# Patient Record
Sex: MALE | Race: OTHER | URBAN - METROPOLITAN AREA
[De-identification: names, ages, dates, MRNs, and addresses within clinical notes are randomized per-mention and may not be internally consistent; named-entity substitution may affect disease eponyms.]

---

## 2018-02-17 ENCOUNTER — INPATIENT (INPATIENT)
Facility: HOSPITAL | Age: 78
LOS: 4 days | Discharge: HOME HEALTH | End: 2018-02-22
Attending: INTERNAL MEDICINE

## 2018-02-17 VITALS
HEART RATE: 73 BPM | SYSTOLIC BLOOD PRESSURE: 160 MMHG | DIASTOLIC BLOOD PRESSURE: 87 MMHG | RESPIRATION RATE: 18 BRPM | TEMPERATURE: 98 F

## 2018-02-17 DIAGNOSIS — Z96.652 PRESENCE OF LEFT ARTIFICIAL KNEE JOINT: Chronic | ICD-10-CM

## 2018-02-17 LAB
ALBUMIN SERPL ELPH-MCNC: 3.8 G/DL — SIGNIFICANT CHANGE UP (ref 3–5.5)
ALP SERPL-CCNC: 54 U/L — SIGNIFICANT CHANGE UP (ref 30–115)
ALT FLD-CCNC: 7 U/L — SIGNIFICANT CHANGE UP (ref 0–41)
ANION GAP SERPL CALC-SCNC: 3 MMOL/L — LOW (ref 7–14)
ANION GAP SERPL CALC-SCNC: 5 MMOL/L — LOW (ref 7–14)
APTT BLD: 21.1 SEC — CRITICAL LOW (ref 27–39.2)
AST SERPL-CCNC: 33 U/L — SIGNIFICANT CHANGE UP (ref 0–41)
BASE EXCESS BLDV CALC-SCNC: 1.4 MMOL/L — SIGNIFICANT CHANGE UP (ref -2–2)
BASOPHILS # BLD AUTO: 0.07 K/UL — SIGNIFICANT CHANGE UP (ref 0–0.2)
BASOPHILS NFR BLD AUTO: 0.8 % — SIGNIFICANT CHANGE UP (ref 0–1)
BILIRUB SERPL-MCNC: 1.4 MG/DL — HIGH (ref 0.2–1.2)
BUN SERPL-MCNC: 13 MG/DL — SIGNIFICANT CHANGE UP (ref 10–20)
BUN SERPL-MCNC: 17 MG/DL — SIGNIFICANT CHANGE UP (ref 10–20)
CA-I SERPL-SCNC: 1.3 MMOL/L — SIGNIFICANT CHANGE UP (ref 1.12–1.3)
CALCIUM SERPL-MCNC: 9.5 MG/DL — SIGNIFICANT CHANGE UP (ref 8.5–10.1)
CALCIUM SERPL-MCNC: 9.6 MG/DL — SIGNIFICANT CHANGE UP (ref 8.5–10.1)
CHLORIDE SERPL-SCNC: 104 MMOL/L — SIGNIFICANT CHANGE UP (ref 98–110)
CHLORIDE SERPL-SCNC: 106 MMOL/L — SIGNIFICANT CHANGE UP (ref 98–110)
CK MB CFR SERPL CALC: 2.3 NG/ML — SIGNIFICANT CHANGE UP (ref 0.6–6.3)
CO2 SERPL-SCNC: 27 MMOL/L — SIGNIFICANT CHANGE UP (ref 17–32)
CO2 SERPL-SCNC: 27 MMOL/L — SIGNIFICANT CHANGE UP (ref 17–32)
CREAT SERPL-MCNC: 1.1 MG/DL — SIGNIFICANT CHANGE UP (ref 0.7–1.5)
CREAT SERPL-MCNC: 1.1 MG/DL — SIGNIFICANT CHANGE UP (ref 0.7–1.5)
EOSINOPHIL # BLD AUTO: 0.12 K/UL — SIGNIFICANT CHANGE UP (ref 0–0.7)
EOSINOPHIL NFR BLD AUTO: 1.3 % — SIGNIFICANT CHANGE UP (ref 0–8)
GAS PNL BLDV: 139 MMOL/L — SIGNIFICANT CHANGE UP (ref 136–145)
GAS PNL BLDV: SIGNIFICANT CHANGE UP
GLUCOSE SERPL-MCNC: 94 MG/DL — SIGNIFICANT CHANGE UP (ref 70–110)
GLUCOSE SERPL-MCNC: 94 MG/DL — SIGNIFICANT CHANGE UP (ref 70–110)
HCO3 BLDV-SCNC: 28 MMOL/L — SIGNIFICANT CHANGE UP (ref 22–29)
HCT VFR BLD CALC: 40.4 % — LOW (ref 42–52)
HGB BLD CALC-MCNC: 14.9 G/DL — SIGNIFICANT CHANGE UP (ref 14–18)
HGB BLD-MCNC: 13 G/DL — LOW (ref 14–18)
IMM GRANULOCYTES NFR BLD AUTO: 0.5 % — HIGH (ref 0.1–0.3)
INR BLD: 1 RATIO — SIGNIFICANT CHANGE UP (ref 0.65–1.3)
LACTATE BLDV-MCNC: 1.5 MMOL/L — SIGNIFICANT CHANGE UP (ref 0.5–1.6)
LYMPHOCYTES # BLD AUTO: 1.06 K/UL — LOW (ref 1.2–3.4)
LYMPHOCYTES # BLD AUTO: 11.6 % — LOW (ref 20.5–51.1)
MCHC RBC-ENTMCNC: 25.1 PG — LOW (ref 27–31)
MCHC RBC-ENTMCNC: 32.2 G/DL — SIGNIFICANT CHANGE UP (ref 32–37)
MCV RBC AUTO: 78.1 FL — LOW (ref 80–94)
MONOCYTES # BLD AUTO: 0.46 K/UL — SIGNIFICANT CHANGE UP (ref 0.1–0.6)
MONOCYTES NFR BLD AUTO: 5 % — SIGNIFICANT CHANGE UP (ref 1.7–9.3)
NEUTROPHILS # BLD AUTO: 7.41 K/UL — HIGH (ref 1.4–6.5)
NEUTROPHILS NFR BLD AUTO: 80.8 % — HIGH (ref 42.2–75.2)
NRBC # BLD: 0 /100 WBCS — SIGNIFICANT CHANGE UP (ref 0–0)
PCO2 BLDV: 52 MMHG — HIGH (ref 41–51)
PH BLDV: 7.34 — SIGNIFICANT CHANGE UP (ref 7.26–7.43)
PLATELET # BLD AUTO: 236 K/UL — SIGNIFICANT CHANGE UP (ref 130–400)
PO2 BLDV: 34 MMHG — SIGNIFICANT CHANGE UP (ref 20–40)
POTASSIUM BLDV-SCNC: 4.6 MMOL/L — SIGNIFICANT CHANGE UP (ref 3.3–5.6)
POTASSIUM SERPL-MCNC: 4.2 MMOL/L — SIGNIFICANT CHANGE UP (ref 3.5–5)
POTASSIUM SERPL-MCNC: SIGNIFICANT CHANGE UP MMOL/L (ref 3.5–5)
POTASSIUM SERPL-SCNC: 4.2 MMOL/L — SIGNIFICANT CHANGE UP (ref 3.5–5)
POTASSIUM SERPL-SCNC: SIGNIFICANT CHANGE UP MMOL/L (ref 3.5–5)
PROT SERPL-MCNC: 6 G/DL — SIGNIFICANT CHANGE UP (ref 6–8)
PROTHROM AB SERPL-ACNC: 10.8 SEC — SIGNIFICANT CHANGE UP (ref 9.95–12.87)
RBC # BLD: 5.17 M/UL — SIGNIFICANT CHANGE UP (ref 4.7–6.1)
RBC # FLD: 14.7 % — HIGH (ref 11.5–14.5)
SAO2 % BLDV: 63 % — SIGNIFICANT CHANGE UP
SODIUM SERPL-SCNC: 134 MMOL/L — LOW (ref 135–146)
SODIUM SERPL-SCNC: 138 MMOL/L — SIGNIFICANT CHANGE UP (ref 135–146)
TROPONIN I SERPL-MCNC: 0.02 NG/ML — SIGNIFICANT CHANGE UP (ref 0–0.05)
WBC # BLD: 9.17 K/UL — SIGNIFICANT CHANGE UP (ref 4.8–10.8)
WBC # FLD AUTO: 9.17 K/UL — SIGNIFICANT CHANGE UP (ref 4.8–10.8)

## 2018-02-17 RX ORDER — HEPARIN SODIUM 5000 [USP'U]/ML
5000 INJECTION INTRAVENOUS; SUBCUTANEOUS EVERY 8 HOURS
Qty: 0 | Refills: 0 | Status: DISCONTINUED | OUTPATIENT
Start: 2018-02-17 | End: 2018-02-22

## 2018-02-17 RX ORDER — TRAMADOL HYDROCHLORIDE 50 MG/1
50 TABLET ORAL EVERY 12 HOURS
Qty: 0 | Refills: 0 | Status: DISCONTINUED | OUTPATIENT
Start: 2018-02-17 | End: 2018-02-20

## 2018-02-17 RX ORDER — AMLODIPINE BESYLATE 2.5 MG/1
5 TABLET ORAL DAILY
Qty: 0 | Refills: 0 | Status: DISCONTINUED | OUTPATIENT
Start: 2018-02-17 | End: 2018-02-17

## 2018-02-17 RX ORDER — ATORVASTATIN CALCIUM 80 MG/1
0 TABLET, FILM COATED ORAL
Qty: 0 | Refills: 0 | COMMUNITY

## 2018-02-17 RX ORDER — TETANUS TOXOID, REDUCED DIPHTHERIA TOXOID AND ACELLULAR PERTUSSIS VACCINE, ADSORBED 5; 2.5; 8; 8; 2.5 [IU]/.5ML; [IU]/.5ML; UG/.5ML; UG/.5ML; UG/.5ML
0.5 SUSPENSION INTRAMUSCULAR ONCE
Qty: 0 | Refills: 0 | Status: COMPLETED | OUTPATIENT
Start: 2018-02-17 | End: 2018-02-17

## 2018-02-17 RX ORDER — ATORVASTATIN CALCIUM 80 MG/1
10 TABLET, FILM COATED ORAL AT BEDTIME
Qty: 0 | Refills: 0 | Status: DISCONTINUED | OUTPATIENT
Start: 2018-02-17 | End: 2018-02-22

## 2018-02-17 RX ORDER — METOPROLOL TARTRATE 50 MG
0 TABLET ORAL
Qty: 0 | Refills: 0 | COMMUNITY

## 2018-02-17 RX ORDER — PANTOPRAZOLE SODIUM 20 MG/1
40 TABLET, DELAYED RELEASE ORAL
Qty: 0 | Refills: 0 | Status: DISCONTINUED | OUTPATIENT
Start: 2018-02-17 | End: 2018-02-22

## 2018-02-17 RX ORDER — AMLODIPINE BESYLATE 2.5 MG/1
1 TABLET ORAL
Qty: 0 | Refills: 0 | COMMUNITY

## 2018-02-17 RX ORDER — HYDROCHLOROTHIAZIDE 25 MG
12.5 TABLET ORAL DAILY
Qty: 0 | Refills: 0 | Status: DISCONTINUED | OUTPATIENT
Start: 2018-02-17 | End: 2018-02-18

## 2018-02-17 RX ORDER — LOSARTAN POTASSIUM 100 MG/1
50 TABLET, FILM COATED ORAL DAILY
Qty: 0 | Refills: 0 | Status: DISCONTINUED | OUTPATIENT
Start: 2018-02-17 | End: 2018-02-21

## 2018-02-17 RX ORDER — SODIUM CHLORIDE 9 MG/ML
1000 INJECTION INTRAMUSCULAR; INTRAVENOUS; SUBCUTANEOUS
Qty: 0 | Refills: 0 | Status: DISCONTINUED | OUTPATIENT
Start: 2018-02-17 | End: 2018-02-20

## 2018-02-17 RX ADMIN — ATORVASTATIN CALCIUM 10 MILLIGRAM(S): 80 TABLET, FILM COATED ORAL at 22:01

## 2018-02-17 RX ADMIN — HEPARIN SODIUM 5000 UNIT(S): 5000 INJECTION INTRAVENOUS; SUBCUTANEOUS at 22:01

## 2018-02-17 RX ADMIN — TETANUS TOXOID, REDUCED DIPHTHERIA TOXOID AND ACELLULAR PERTUSSIS VACCINE, ADSORBED 0.5 MILLILITER(S): 5; 2.5; 8; 8; 2.5 SUSPENSION INTRAMUSCULAR at 15:49

## 2018-02-17 NOTE — H&P ADULT - ASSESSMENT
78 year old male w/ hx of htn / dementia presents s/p fall w/ syncope    # Fall  - 2/2 syncopal episode, unclear etiology  - ddx includes cardiac / neurological / vascular etiologies  - check orthostatics,   -     # Dementia   - mild to moderate, pt alert and oriented to person and place currently  - not currently on any medications    # Hypertension  - c/w home meds    # DVT ppx    # From elderly home / ambulates without assistance 78 year old male w/ hx of htn / dementia presents s/p fall w/ syncope    # Fall  - 2/2 syncopal episode, unclear etiology  - ddx includes cardiac / neurological / vascular etiologies  - trauma w/u negatie / CT head negative / electrolytes wnl / cbc wnl  - check orthostatics / 2d echo / carotid duplex /   - monitor on telemetry    # Dementia   - mild to moderate, pt alert and oriented to person and place currently  - not currently on any medications    # Hypertension  - /87, c/w home meds    # DVT ppx    # From elderly home / ambulates without assistance 78 year old male w/ hx of htn / dementia presents s/p fall w/ syncope    # Fall  - 2/2 syncopal episode, unclear etiology  - ddx includes cardiac / neurological / vascular etiologies  - trauma w/u negatie / CT head negative / electrolytes wnl / cbc wnl  - check orthostatics / 2d echo / carotid duplex /   - monitor on telemetry    # Dementia   - mild to moderate, pt alert and oriented to person and place currently  - not currently on any medications, consider starting donepezil  - check b12, folate, tsh    # Hypertension  - /87, c/w home meds    # DVT ppx    # From elderly home / ambulates without assistance 78 year old male w/ hx of htn / dementia presents s/p fall w/ syncope    # Fall  - 2/2 syncopal episode, unclear etiology  - ddx includes cardiac / neurological / vascular etiologies  - trauma w/u negatie / CT head negative / electrolytes wnl k+ hemolyzed will recheck/ cbc wnl  - check orthostatics / 2d echo / carotid duplex /   - monitor on telemetry    # Dementia   - mild to moderate, pt alert and oriented to person and place currently  - not currently on any medications, consider starting donepezil  - check b12, folate, tsh    # Hypertension  - /87, c/w home meds    # DVT ppx    # From elderly home / ambulates without assistance

## 2018-02-17 NOTE — H&P ADULT - NSHPREVIEWOFSYSTEMS_GEN_ALL_CORE
Review of Systems: Unable to obtain secondary to confusion REVIEW OF SYSTEMS:    CONSTITUTIONAL: No weakness, fevers or chills  EYES/ENT: No visual changes;  No vertigo or throat pain   NECK: No pain or stiffness  RESPIRATORY: No cough, wheezing, hemoptysis; No shortness of breath  CARDIOVASCULAR: No chest pain or palpitations  GASTROINTESTINAL: No abdominal or epigastric pain. No nausea, vomiting, or hematemesis; No diarrhea or constipation. No melena or hematochezia.  GENITOURINARY: No dysuria, frequency or hematuria  MUSCULOSKELETAL: Left hand pain / left eye pain  NEUROLOGICAL: No numbness or weakness  SKIN: No itching, rashes

## 2018-02-17 NOTE — H&P ADULT - HISTORY OF PRESENT ILLNESS
78 year old male w/ pmhx of htn, dementia lives at home with his wife.     79 yo M with unknown medical hx secondary to confusion, BIBEMS for fall, onset today, associated with abrasion to forehead, laceration to lip and left cheek, and left hand pain. Using a , spoke to patient regarding the fall, he states that he cannot remember the fall and came back to his "conciousness" when he was in the ambulance. Patient cannot recall his medications. Aside from the hand pain and the lip pain, patient is not complaining of any other pain 78 year old male w/ pmhx of htn, dementia lives in an elderly home with his wife - however at baseline functions mostly independently. Today was walking to a [facility] - at some point he was found on the floor by a passerby who called ems. The patient himself does not recall any of this and seems to begin remembering things from when he was in the ambulance. Does not remembering falling or the events that led up to this. In the ER pt found to have abrasion of the forehead, laceration to the lip and left cheek / as well as let hand pain. He does not have any other complaints.

## 2018-02-17 NOTE — ED PROVIDER NOTE - EYES, MLM
Clear bilaterally, right pupil reactive light asymmetry, left pupil round and reactive to light, EOMI

## 2018-02-17 NOTE — H&P ADULT - NSHPLABSRESULTS_GEN_ALL_CORE
13.0   9.17  )-----------( 236      ( 17 Feb 2018 10:57 )             40.4       02-17    138  |  106  |  17  ----------------------------<  94  tnp   |  27  |  1.1    Ca    9.6      17 Feb 2018 10:57    TPro  6.0  /  Alb  3.8  /  TBili  1.4<H>  /  DBili  x   /  AST  33  /  ALT  7   /  AlkPhos  54  02-17      PT/INR - ( 17 Feb 2018 10:57 )   PT: 10.80 sec;   INR: 1.00 ratio      PTT - ( 17 Feb 2018 10:57 )  PTT:21.1 sec    CARDIAC MARKERS ( 17 Feb 2018 10:57 )  0.02 ng/mL / x     / x     / x     / 2.3 ng/mL

## 2018-02-17 NOTE — ED PROVIDER NOTE - PROGRESS NOTE DETAILS
Spoke to Nasreen, daughter, on the phone [phone], aware patient is in the hospital and is on her way here. Nasreen, daughter, at bedside, states patient has dementia and HTN. Does not know his medications, states that his heart is "good"

## 2018-02-17 NOTE — ED PROVIDER NOTE - MUSCULOSKELETAL, MLM
full ROM, left hand there is an abrasion to the dorsal aspect with tenderness, no muscle or joint tenderness

## 2018-02-17 NOTE — ED PROCEDURE NOTE - ATTENDING CONTRIBUTION TO CARE
I supervised resident or ACP on key aspects of procedure and was available at any time during procedure.

## 2018-02-17 NOTE — H&P ADULT - NSHPPHYSICALEXAM_GEN_ALL_CORE
PHYSICAL EXAM:   CONSTITUTIONAL: Well appearing, well nourished, awake, alert, oriented to person  ENMT: Airway patent, Nasal mucosa clear. Mouth with normal mucosa. there is a laceration to the upper lip that does involve the vermillion border, no tooth injury  EYES: Clear bilaterally, right pupil reactive light asymmetry, left pupil round and reactive to light, EOMI  CARDIAC: Normal rate, regular rhythm.  Heart sounds S1, S2.  RESPIRATORY: Breath sounds clear and equal bilaterally.  GASTROINTESTINAL: Abdomen soft, non-tender, no guarding.  MUSCULOSKELETAL: full ROM, left hand there is an abrasion to the dorsal aspect with tenderness, no muscle or joint tenderness  NEUROLOGICAL: Alert and oriented to person, CN II-XII intact, follows commands  SKIN: Skin normal color for race, warm, dry and intact. No evidence of rash. abrasions to the forehead and to the left hand, laceration to the left cheek and to the upper lip

## 2018-02-17 NOTE — ED PROVIDER NOTE - OBJECTIVE STATEMENT
[id number] 79 yo M with unknown medical hx secondary to confusion, BIBEMS for fall, onset today, associated with abrasion to forehead, laceration to lip and left cheek, and left hand pain. Using a , spoke to patient regarding the fall, he states that he cannot remember the fall and came back to his "conciousness" when he was in the ambulance. Patient cannot recall his medications. Aside from the hand pain and the lip pain, patient is not complaining of any other pain       [id number] 79 yo M with unknown medical hx secondary to confusion, BIBEMS for fall, onset today, associated with abrasion to forehead, laceration to lip and left cheek, and left hand pain. Using a , spoke to patient regarding the fall, he states that he cannot remember the fall and came back to his "conciousness" when he was in the ambulance. Patient cannot recall his medications. Aside from the hand pain and the lip pain, patient is not complaining of any other pain     [id number]    PMD: JOVON

## 2018-02-17 NOTE — ED PROVIDER NOTE - ATTENDING CONTRIBUTION TO CARE
77 y/o M, unclear med hx, here for eval s/p unwitnessed fall.  py is poor historian.  Went out for walk, then awoke in ambulance.  + pain to face and L hand and wrist.  no HA, cp, sob,.  No recent illness.  PE: + abrasion to L forehead and L cheek, + lac to upper lip, crossing vermilion border, + small chip to upper L central incisor; + dried blood to R nare w/o septal hematoma; no other dental or ent injury; neck supple; CTAB ;RRR; abd s/nt; pelvis stable; + abrasions and skin avulsions to L dorsal hand and wrist; + mild swelling at 3rd MCP; nl pulses and cap refill; extem o/w non tnder/ atraumatic; spine non ttp; neuro grossly non focal; ambulating.  IMP: fall, consider syncope; abrasions.  P: cth ct cspine, xray imaging, labs, ua, tdap, ivf, analgesia, reassess.

## 2018-02-17 NOTE — ED PROVIDER NOTE - SKIN, MLM
Skin normal color for race, warm, dry and intact. No evidence of rash. abrasions to the forehead and to the left hand, laceration to the left cheek and to the upper lip

## 2018-02-17 NOTE — ED PROVIDER NOTE - ENMT, MLM
Airway patent, Nasal mucosa clear. Mouth with normal mucosa. there is a laceration to the upper lip that does not involve the vermillion border, no tooth injury Airway patent, Nasal mucosa clear. Mouth with normal mucosa. there is a laceration to the upper lip that does involve the vermillion border, no tooth injury

## 2018-02-18 LAB
ANION GAP SERPL CALC-SCNC: 8 MMOL/L — SIGNIFICANT CHANGE UP (ref 7–14)
BUN SERPL-MCNC: 13 MG/DL — SIGNIFICANT CHANGE UP (ref 10–20)
CALCIUM SERPL-MCNC: 9.5 MG/DL — SIGNIFICANT CHANGE UP (ref 8.5–10.1)
CHLORIDE SERPL-SCNC: 107 MMOL/L — SIGNIFICANT CHANGE UP (ref 98–110)
CK MB CFR SERPL CALC: 2.4 NG/ML — SIGNIFICANT CHANGE UP (ref 0.6–6.3)
CK SERPL-CCNC: 162 U/L — SIGNIFICANT CHANGE UP (ref 0–225)
CO2 SERPL-SCNC: 23 MMOL/L — SIGNIFICANT CHANGE UP (ref 17–32)
CREAT SERPL-MCNC: 1 MG/DL — SIGNIFICANT CHANGE UP (ref 0.7–1.5)
GLUCOSE SERPL-MCNC: 78 MG/DL — SIGNIFICANT CHANGE UP (ref 70–110)
HCT VFR BLD CALC: 38.7 % — LOW (ref 42–52)
HGB BLD-MCNC: 12.3 G/DL — LOW (ref 14–18)
MAGNESIUM SERPL-MCNC: 2.1 MG/DL — SIGNIFICANT CHANGE UP (ref 1.8–2.4)
MCHC RBC-ENTMCNC: 24.7 PG — LOW (ref 27–31)
MCHC RBC-ENTMCNC: 31.8 G/DL — LOW (ref 32–37)
MCV RBC AUTO: 77.9 FL — LOW (ref 80–94)
NRBC # BLD: 0 /100 WBCS — SIGNIFICANT CHANGE UP (ref 0–0)
PLATELET # BLD AUTO: 218 K/UL — SIGNIFICANT CHANGE UP (ref 130–400)
POTASSIUM SERPL-MCNC: 4.2 MMOL/L — SIGNIFICANT CHANGE UP (ref 3.5–5)
POTASSIUM SERPL-SCNC: 4.2 MMOL/L — SIGNIFICANT CHANGE UP (ref 3.5–5)
RBC # BLD: 4.97 M/UL — SIGNIFICANT CHANGE UP (ref 4.7–6.1)
RBC # FLD: 14.8 % — HIGH (ref 11.5–14.5)
SODIUM SERPL-SCNC: 138 MMOL/L — SIGNIFICANT CHANGE UP (ref 135–146)
TROPONIN I SERPL-MCNC: 0.03 NG/ML — SIGNIFICANT CHANGE UP (ref 0–0.05)
WBC # BLD: 6.46 K/UL — SIGNIFICANT CHANGE UP (ref 4.8–10.8)
WBC # FLD AUTO: 6.46 K/UL — SIGNIFICANT CHANGE UP (ref 4.8–10.8)

## 2018-02-18 RX ADMIN — Medication 12.5 MILLIGRAM(S): at 05:43

## 2018-02-18 RX ADMIN — PANTOPRAZOLE SODIUM 40 MILLIGRAM(S): 20 TABLET, DELAYED RELEASE ORAL at 06:08

## 2018-02-18 RX ADMIN — TRAMADOL HYDROCHLORIDE 50 MILLIGRAM(S): 50 TABLET ORAL at 19:19

## 2018-02-18 RX ADMIN — ATORVASTATIN CALCIUM 10 MILLIGRAM(S): 80 TABLET, FILM COATED ORAL at 21:25

## 2018-02-18 RX ADMIN — LOSARTAN POTASSIUM 50 MILLIGRAM(S): 100 TABLET, FILM COATED ORAL at 05:43

## 2018-02-18 RX ADMIN — TRAMADOL HYDROCHLORIDE 50 MILLIGRAM(S): 50 TABLET ORAL at 05:43

## 2018-02-18 RX ADMIN — HEPARIN SODIUM 5000 UNIT(S): 5000 INJECTION INTRAVENOUS; SUBCUTANEOUS at 14:33

## 2018-02-18 RX ADMIN — HEPARIN SODIUM 5000 UNIT(S): 5000 INJECTION INTRAVENOUS; SUBCUTANEOUS at 21:25

## 2018-02-18 RX ADMIN — TRAMADOL HYDROCHLORIDE 50 MILLIGRAM(S): 50 TABLET ORAL at 18:19

## 2018-02-18 RX ADMIN — HEPARIN SODIUM 5000 UNIT(S): 5000 INJECTION INTRAVENOUS; SUBCUTANEOUS at 05:43

## 2018-02-18 RX ADMIN — TRAMADOL HYDROCHLORIDE 50 MILLIGRAM(S): 50 TABLET ORAL at 06:32

## 2018-02-19 LAB
FOLATE SERPL-MCNC: 9.6 NG/ML — SIGNIFICANT CHANGE UP (ref 4.8–24.2)
TSH SERPL-MCNC: 2.25 UIU/ML — SIGNIFICANT CHANGE UP (ref 0.27–4.2)
VIT B12 SERPL-MCNC: 163 PG/ML — LOW (ref 232–1245)

## 2018-02-19 RX ORDER — AMLODIPINE BESYLATE 2.5 MG/1
2.5 TABLET ORAL DAILY
Qty: 0 | Refills: 0 | Status: DISCONTINUED | OUTPATIENT
Start: 2018-02-19 | End: 2018-02-19

## 2018-02-19 RX ORDER — ASPIRIN/CALCIUM CARB/MAGNESIUM 324 MG
81 TABLET ORAL DAILY
Qty: 0 | Refills: 0 | Status: DISCONTINUED | OUTPATIENT
Start: 2018-02-19 | End: 2018-02-22

## 2018-02-19 RX ORDER — ONDANSETRON 8 MG/1
4 TABLET, FILM COATED ORAL EVERY 6 HOURS
Qty: 0 | Refills: 0 | Status: DISCONTINUED | OUTPATIENT
Start: 2018-02-19 | End: 2018-02-22

## 2018-02-19 RX ADMIN — TRAMADOL HYDROCHLORIDE 50 MILLIGRAM(S): 50 TABLET ORAL at 18:35

## 2018-02-19 RX ADMIN — PANTOPRAZOLE SODIUM 40 MILLIGRAM(S): 20 TABLET, DELAYED RELEASE ORAL at 06:07

## 2018-02-19 RX ADMIN — LOSARTAN POTASSIUM 50 MILLIGRAM(S): 100 TABLET, FILM COATED ORAL at 05:27

## 2018-02-19 RX ADMIN — HEPARIN SODIUM 5000 UNIT(S): 5000 INJECTION INTRAVENOUS; SUBCUTANEOUS at 05:27

## 2018-02-19 RX ADMIN — TRAMADOL HYDROCHLORIDE 50 MILLIGRAM(S): 50 TABLET ORAL at 05:27

## 2018-02-19 RX ADMIN — Medication 81 MILLIGRAM(S): at 18:35

## 2018-02-19 RX ADMIN — ATORVASTATIN CALCIUM 10 MILLIGRAM(S): 80 TABLET, FILM COATED ORAL at 21:11

## 2018-02-19 RX ADMIN — TRAMADOL HYDROCHLORIDE 50 MILLIGRAM(S): 50 TABLET ORAL at 06:07

## 2018-02-19 RX ADMIN — HEPARIN SODIUM 5000 UNIT(S): 5000 INJECTION INTRAVENOUS; SUBCUTANEOUS at 21:12

## 2018-02-19 RX ADMIN — HEPARIN SODIUM 5000 UNIT(S): 5000 INJECTION INTRAVENOUS; SUBCUTANEOUS at 15:07

## 2018-02-19 RX ADMIN — TRAMADOL HYDROCHLORIDE 50 MILLIGRAM(S): 50 TABLET ORAL at 21:10

## 2018-02-19 NOTE — PROGRESS NOTE ADULT - ASSESSMENT
78 year old male w/ hx of htn / dementia presents s/p fall w/ syncope    # Fall  - 2/2 syncopal episode, unclear etiology  - ddx includes cardiac / neurological / vascular etiologies  - trauma w/u negative / CT head negative / cbc wnl  - check orthostatics - / 2d echo results back/ carotid duplex results back /   - monitor on telemetry    # Dementia   - mild to moderate, pt alert and oriented to person and place  - not currently on any medications  - check b12, folate, tsh    # Hypertension  - elevated BP, c/w home meds, added amlodipine    # DVT ppx    # From elderly home / ambulates without assistance

## 2018-02-19 NOTE — PROGRESS NOTE ADULT - ATTENDING COMMENTS
Agree with resident note with exceptions.    1.Syncope  - r/o vertibrobasilar insufficiensy  - Neurology eval  - orthostatics+, IV fluids.  - HCTZ discontinued.  - F/u  UA.  - PT eval.  2. Gastritis  - protonix, zofran prn  3. HTN   ct losartan  4. Dyslipidemia  - ct statin .

## 2018-02-20 RX ORDER — SENNA PLUS 8.6 MG/1
1 TABLET ORAL THREE TIMES A DAY
Qty: 0 | Refills: 0 | Status: DISCONTINUED | OUTPATIENT
Start: 2018-02-20 | End: 2018-02-22

## 2018-02-20 RX ORDER — DOCUSATE SODIUM 100 MG
100 CAPSULE ORAL DAILY
Qty: 0 | Refills: 0 | Status: DISCONTINUED | OUTPATIENT
Start: 2018-02-20 | End: 2018-02-22

## 2018-02-20 RX ADMIN — PANTOPRAZOLE SODIUM 40 MILLIGRAM(S): 20 TABLET, DELAYED RELEASE ORAL at 06:25

## 2018-02-20 RX ADMIN — LOSARTAN POTASSIUM 50 MILLIGRAM(S): 100 TABLET, FILM COATED ORAL at 05:16

## 2018-02-20 RX ADMIN — SENNA PLUS 1 TABLET(S): 8.6 TABLET ORAL at 15:17

## 2018-02-20 RX ADMIN — HEPARIN SODIUM 5000 UNIT(S): 5000 INJECTION INTRAVENOUS; SUBCUTANEOUS at 05:16

## 2018-02-20 RX ADMIN — Medication 81 MILLIGRAM(S): at 12:03

## 2018-02-20 RX ADMIN — ATORVASTATIN CALCIUM 10 MILLIGRAM(S): 80 TABLET, FILM COATED ORAL at 21:21

## 2018-02-20 RX ADMIN — SENNA PLUS 1 TABLET(S): 8.6 TABLET ORAL at 21:21

## 2018-02-20 RX ADMIN — TRAMADOL HYDROCHLORIDE 50 MILLIGRAM(S): 50 TABLET ORAL at 05:16

## 2018-02-20 RX ADMIN — HEPARIN SODIUM 5000 UNIT(S): 5000 INJECTION INTRAVENOUS; SUBCUTANEOUS at 15:17

## 2018-02-20 RX ADMIN — TRAMADOL HYDROCHLORIDE 50 MILLIGRAM(S): 50 TABLET ORAL at 07:25

## 2018-02-20 RX ADMIN — HEPARIN SODIUM 5000 UNIT(S): 5000 INJECTION INTRAVENOUS; SUBCUTANEOUS at 21:22

## 2018-02-20 RX ADMIN — Medication 100 MILLIGRAM(S): at 15:16

## 2018-02-20 NOTE — CONSULT NOTE ADULT - SUBJECTIVE AND OBJECTIVE BOX
HPI:  78 year old male w/ pmhx of htn, dementia lives in an elderly home with his wife - however at baseline functions mostly independently. Today was walking to a [facility] - at some point he was found on the floor by a passerby who called ems. The patient himself does not recall any of this and seems to begin remembering things from when he was in the ambulance. Does not remembering falling or the events that led up to this. In the ER pt found to have abrasion of the forehead, laceration to the lip and left cheek / as well as let hand pain. He does not have any other complaints. (17 Feb 2018 16:51)      PAST MEDICAL & SURGICAL HISTORY:  Dementia  HTN (hypertension)  S/P TKR (total knee replacement), left      Hospital Course:        MEDICATIONS  (STANDING):  aspirin enteric coated 81 milliGRAM(s) Oral daily  atorvastatin 10 milliGRAM(s) Oral at bedtime  docusate sodium 100 milliGRAM(s) Oral daily  heparin  Injectable 5000 Unit(s) SubCutaneous every 8 hours  losartan 50 milliGRAM(s) Oral daily  pantoprazole    Tablet 40 milliGRAM(s) Oral before breakfast  senna 1 Tablet(s) Oral three times a day    MEDICATIONS  (PRN):  ondansetron Injectable 4 milliGRAM(s) IV Push every 6 hours PRN Nausea and/or Vomiting      FAMILY HISTORY:  No pertinent family history in first degree relatives      Allergies    No Known Allergies    Intolerances        SOCIAL HISTORY:    [  ] Etoh  [  ] Smoking  [  ] Substance abuse     Home Environment:  [x  ] Home Alone  [  ] Lives with Family  [  ] Home Health Aid    Dwelling:  [  ] Apartment  [x  ] Private House  [  ] Adult Home  [  ] Skilled Nursing Facility      [  ] Short Term  [  ] Long Term  [  ] Stairs       Elevator [  ]    FUNCTIONAL STATUS PTA: (Check all that apply)  Ambulation: [  x ]Independent    [  ] Dependent     [  ] Non-Ambulatory  Assistive Device: [ x ] SA Cane  [  ]  Q Cane  [  ] Walker  [  ]  Wheelchair  ADL : [ x ] Independent  [  ]  Dependent       Vital Signs Last 24 Hrs  T(C): 36.2 (20 Feb 2018 06:13), Max: 36.4 (19 Feb 2018 13:42)  T(F): 97.2 (20 Feb 2018 06:13), Max: 97.6 (19 Feb 2018 13:42)  HR: 70 (19 Feb 2018 13:42) (70 - 70)  BP: 183/84 (19 Feb 2018 21:07) (179/79 - 183/84)  BP(mean): --  RR: 20 (20 Feb 2018 06:13) (17 - 20)  SpO2: --      PHYSICAL EXAM: Alert & awake  GENERAL: NAD, well-groomed, well-developed  HEAD:  Atraumatic, Normocephalic  EYES: EOMI, PERRLA, conjunctiva and sclera clear  NECK: Supple, No JVD, Normal thyroid  CHEST/LUNG: Clear to percussion bilaterally; No rales, rhonchi, wheezing, or rubs  HEART: Regular rate and rhythm; No murmurs, rubs, or gallops  ABDOMEN: Soft, Nontender, Nondistended; Bowel sounds present  EXTREMITIES:  2+ Peripheral Pulses, No clubbing, cyanosis, or edema    NERVOUS SYSTEM:  Cranial Nerves 2-12 intact [  ] Abnormal  [  ]  ROM: WFL all extremities [ x ]  Abnormal [  ]  Motor Strength: WFL all extremities  [x  ]  Abnormal [  ]  Sensation: intact to light touch [  ] Abnormal [  ]  Reflexes: Symmetric [  ]  Abnormal [  ]    FUNCTIONAL STATUS:  Bed Mobility: Independent [  ]  Supervision [ x ]  Needs Assistance [  ]  N/A [  ]  Transfers: Independent [  ]  Supervision [  ]  Needs Assistance [x  ]  N/A [  ]   Ambulation: Independent [  ]  Supervision [  ]  Needs Assistance [  ]  N/A [  ]  ADL: Independent [  ] Requires Assistance [  ] N/A [  ]      LABS:                RADIOLOGY & ADDITIONAL STUDIES:    Assesment:

## 2018-02-20 NOTE — PROGRESS NOTE ADULT - ASSESSMENT
78 year old male w/ pmhx of htn, dementia lives in an elderly home with his wife - however at baseline functions mostly independently. Today was walking to a [facility] - at some point he was found on the floor by a passerby who called ems.      1.Syncope   neurology:  EEG P, d/c tramadol, no driving  - MRA head and neck pending, MRI brain P  - orthostatics+  - HCTZ discontinued.  - F/u  UA.  - PT eval - home  2. Gastritis resolved  - protonix, zofran prn  3. HTN   ct losartan  4. Dyslipidemia  - c/w statin .    5. GI/ DVT prophylaxis.    Patients family was updated.

## 2018-02-20 NOTE — PROGRESS NOTE ADULT - SUBJECTIVE AND OBJECTIVE BOX
SUBJECTIVE:    Patient is a 78y old  Male who presents with a chief complaint of fall (17 Feb 2018 16:51)    Currently admitted to medicine with the primary diagnosis of Syncope and collapse     Today is hospital day 3d. This morning he is resting comfortably in bed and reports no new issues or overnight events. Denies headache, dizziness, nausea, Vomitting today. Patient was ordered an MRI brain, MRA H+N, tramadol d/c as per neuro.    PAST MEDICAL & SURGICAL HISTORY  PAST MEDICAL & SURGICAL HISTORY:  Dementia  HTN (hypertension)  S/P TKR (total knee replacement), left    SOCIAL HISTORY:    ALLERGIES:  No Known Allergies    MEDICATIONS:  STANDING MEDICATIONS  aspirin enteric coated 81 milliGRAM(s) Oral daily  atorvastatin 10 milliGRAM(s) Oral at bedtime  docusate sodium 100 milliGRAM(s) Oral daily  heparin  Injectable 5000 Unit(s) SubCutaneous every 8 hours  losartan 50 milliGRAM(s) Oral daily  pantoprazole    Tablet 40 milliGRAM(s) Oral before breakfast  senna 1 Tablet(s) Oral three times a day    PRN MEDICATIONS  ondansetron Injectable 4 milliGRAM(s) IV Push every 6 hours PRN    VITALS:   T(F): 98  HR: 65  BP: 137/65  RR: 16  SpO2: 96%    LABS:                        RADIOLOGY:    PHYSICAL EXAM:  HEENT: abrasions to the forehead.  EOMI.  Chest: clear.  CVS: SIS2 +, no murmur.  P/A: Soft, BS+  CNS: non focal.  Ext: no edema feet.  Skin: brasions to the forehead and  left hand, laceration to the left cheek and to the upper lip

## 2018-02-20 NOTE — CONSULT NOTE ADULT - SUBJECTIVE AND OBJECTIVE BOX
Neurology Consult    Patient is a 78y old  Male who presents with a chief complaint of fall (17 Feb 2018 16:51)      HPI:  78 year old male w/ pmhx of htn, dementia lives in an elderly home with his wife - however at baseline functions mostly independently. Today was [facility] a [facility] - at some point he was found on the floor by a passerby who called ems. The patient himself does not recall any of this and seems to begin remembering things from when he was in the ambulance. Does not remembering falling or the events that led up to this. In the ER pt found to have abrasion of the forehead, laceration to the lip and left cheek / as well as let hand pain. He does not have any other complaints. (17 Feb 2018 16:51)      PAST MEDICAL & SURGICAL HISTORY:  Dementia  HTN (hypertension)  S/P TKR (total knee replacement), left      FAMILY HISTORY:  No pertinent family history in first degree relatives      Social History: (-) x 3    Allergies    No Known Allergies    Intolerances        MEDICATIONS  (STANDING):  aspirin enteric coated 81 milliGRAM(s) Oral daily  atorvastatin 10 milliGRAM(s) Oral at bedtime  heparin  Injectable 5000 Unit(s) SubCutaneous every 8 hours  losartan 50 milliGRAM(s) Oral daily  pantoprazole    Tablet 40 milliGRAM(s) Oral before breakfast  sodium chloride 0.9%. 1000 milliLiter(s) (50 mL/Hr) IV Continuous <Continuous>  traMADol 50 milliGRAM(s) Oral every 12 hours    MEDICATIONS  (PRN):  ondansetron Injectable 4 milliGRAM(s) IV Push every 6 hours PRN Nausea and/or Vomiting      Review of systems:    Constitutional: No fever, weight loss or fatigue    Eyes: No eye pain or discharge  ENMT:  No difficulty hearing; No sinus or throat pain  Neck: No pain or stiffness  Respiratory: No cough, wheezing, chills or hemoptysis  Cardiovascular: No chest pain, palpitations, shortness of breath, dyspnea on exertion  Gastrointestinal: No abdominal pain, nausea, vomiting or hematemesis; No diarrhea or constipation.   Genitourinary: No dysuria, frequency, hematuria or incontinence  Neurological: As per HPI  Skin: No rashes or lesions   Endocrine: No heat or cold intolerance; No hair loss  Musculoskeletal: No joint pain or swelling  Psychiatric: No depression, anxiety, mood swings  Heme/Lymph: No easy bruising or bleeding gums    Vital Signs Last 24 Hrs  T(C): 36.4 (19 Feb 2018 21:07), Max: 36.4 (19 Feb 2018 13:42)  T(F): 97.6 (19 Feb 2018 21:07), Max: 97.6 (19 Feb 2018 13:42)  HR: 70 (19 Feb 2018 13:42) (70 - 72)  BP: 183/84 (19 Feb 2018 21:07) (179/79 - 189/75)  BP(mean): --  RR: 20 (19 Feb 2018 21:07) (17 - 20)  SpO2: --    Neurologic Examination:  General:  Appearance is consistent with chronologic age.  No abnormal facies.   General: The patient is oriented to person, place, time and date.  Recent and remote memory intact.  Fund of knowledge is intact and normal.  Language with normal repetition, comprehension and naming.  Nondysarthric.    Cranial nerves: intact VA, VFF.  EOMI w/o nystagmus, skew or reported double vision.  PERRL.  No ptosis/weakness of eyelid closure.  Facial sensation is normal with normal bite.  No facial asymmetry.  Hearing grossly intact b/l.  Palate elevates midline.  Tongue midline.  Motor examination:   Normal tone, bulk and range of motion.  No tenderness, twitching, tremors or involuntary movements.  Formal Muscle Strength Testing: (MRC grade R/L) 5/5 UE; 5/5 LE.  No observable drift.  Reflexes:   2+ b/l pectoralis, biceps, triceps, brachioradialis, patella and Achilles.  Plantar response downgoing b/l.  Jaw jerk, Hugo, clonus absent.  Sensory examination:   Intact to light touch and pinprick, pain, temperature and proprioception and vibration in all extremities.  Cerebellum:   FTN/HKS intact with normal EDIN in all limbs.  No dysmetria or dysdiadokinesia.  Gait narrow based and normal.    Labs:   CBC Full  -  ( 18 Feb 2018 05:46 )  WBC Count : 6.46 K/uL  Hemoglobin : 12.3 g/dL  Hematocrit : 38.7 %  Platelet Count - Automated : 218 K/uL  Mean Cell Volume : 77.9 fL  Mean Cell Hemoglobin : 24.7 pg  Mean Cell Hemoglobin Concentration : 31.8 g/dL  Auto Neutrophil # : x  Auto Lymphocyte # : x  Auto Monocyte # : x  Auto Eosinophil # : x  Auto Basophil # : x  Auto Neutrophil % : x  Auto Lymphocyte % : x  Auto Monocyte % : x  Auto Eosinophil % : x  Auto Basophil % : x    02-18    138  |  107  |  13  ----------------------------<  78  4.2   |  23  |  1.0    Ca    9.5      18 Feb 2018 05:46  Mg     2.1     02-18                Neuroimaging:  NCHCT:   CT Angiography/Perfusion:  MRI Brain NC:  MRA Head/Neck:  EEG:    Assessment:  This is a 78y Male with h/o     Plan:   - 02-20-18 @ 01:37 Neurology Consult    Patient is a 78y old  Male who presents with a chief complaint of fall (17 Feb 2018 16:51)      HPI:  78 year old male w/ pmhx of htn, dementia lives in an elderly home with his wife - however at baseline functions mostly independently. [facility] was [facility] - at some point he was found on the floor by a passerby who called ems. The patient himself does not recall any of this and seems to begin remembering things from when he was in the ambulance. Does not remembering falling or the events that led up to this. In the ER pt found to have abrasion of the forehead, laceration to the lip and left cheek / as well as let hand pain. He does not have any other complaints. (17 Feb 2018 16:51)      PAST MEDICAL & SURGICAL HISTORY:  Dementia  HTN (hypertension)  S/P TKR (total knee replacement), left      FAMILY HISTORY:  No pertinent family history in first degree relatives      Social History: (-) x 3    Allergies    No Known Allergies    Intolerances        MEDICATIONS  (STANDING):  aspirin enteric coated 81 milliGRAM(s) Oral daily  atorvastatin 10 milliGRAM(s) Oral at bedtime  heparin  Injectable 5000 Unit(s) SubCutaneous every 8 hours  losartan 50 milliGRAM(s) Oral daily  pantoprazole    Tablet 40 milliGRAM(s) Oral before breakfast  sodium chloride 0.9%. 1000 milliLiter(s) (50 mL/Hr) IV Continuous <Continuous>  traMADol 50 milliGRAM(s) Oral every 12 hours    MEDICATIONS  (PRN):  ondansetron Injectable 4 milliGRAM(s) IV Push every 6 hours PRN Nausea and/or Vomiting      Review of systems:    Constitutional: No fever, weight loss or fatigue    Eyes: No eye pain or discharge  ENMT:  No difficulty hearing; No sinus or throat pain  Neck: No pain or stiffness  Respiratory: No cough, wheezing, chills or hemoptysis  Cardiovascular: No chest pain, palpitations, shortness of breath, dyspnea on exertion  Gastrointestinal: No abdominal pain, nausea, vomiting or hematemesis; No diarrhea or constipation.   Genitourinary: No dysuria, frequency, hematuria or incontinence  Neurological: As per HPI  Skin: No rashes or lesions   Endocrine: No heat or cold intolerance; No hair loss  Musculoskeletal: No joint pain or swelling  Psychiatric: No depression, anxiety, mood swings  Heme/Lymph: No easy bruising or bleeding gums    Vital Signs Last 24 Hrs  T(C): 36.4 (19 Feb 2018 21:07), Max: 36.4 (19 Feb 2018 13:42)  T(F): 97.6 (19 Feb 2018 21:07), Max: 97.6 (19 Feb 2018 13:42)  HR: 70 (19 Feb 2018 13:42) (70 - 72)  BP: 183/84 (19 Feb 2018 21:07) (179/79 - 189/75)  BP(mean): --  RR: 20 (19 Feb 2018 21:07) (17 - 20)  SpO2: --    Neurologic Examination:  General:  Appearance is consistent with chronologic age.  No abnormal facies.   General: The patient is oriented to person, place, time and date.  Recent and remote memory intact.  Fund of knowledge is intact and normal.  Language with normal repetition, comprehension and naming.  Nondysarthric.    Cranial nerves: intact VA, VFF.  EOMI w/o nystagmus, skew or reported double vision.  PERRL.  No ptosis/weakness of eyelid closure.  Facial sensation is normal with normal bite.  No facial asymmetry.  Hearing grossly intact b/l.  Palate elevates midline.  Tongue midline.  Motor examination:   Normal tone, bulk and range of motion.  No tenderness, twitching, tremors or involuntary movements.  Formal Muscle Strength Testing: (MRC grade R/L) 5/5 UE; 5/5 LE.  No observable drift.  Reflexes:   2+ b/l pectoralis, biceps, triceps, brachioradialis, patella and Achilles.  Plantar response downgoing b/l.  Jaw jerk, Hugo, clonus absent.  Sensory examination:   Intact to light touch and pinprick, pain, temperature and proprioception and vibration in all extremities.  Cerebellum:   FTN/HKS intact with normal EDIN in all limbs.  No dysmetria or dysdiadokinesia.  Gait narrow based and normal.    Labs:   CBC Full  -  ( 18 Feb 2018 05:46 )  WBC Count : 6.46 K/uL  Hemoglobin : 12.3 g/dL  Hematocrit : 38.7 %  Platelet Count - Automated : 218 K/uL  Mean Cell Volume : 77.9 fL  Mean Cell Hemoglobin : 24.7 pg  Mean Cell Hemoglobin Concentration : 31.8 g/dL  Auto Neutrophil # : x  Auto Lymphocyte # : x  Auto Monocyte # : x  Auto Eosinophil # : x  Auto Basophil # : x  Auto Neutrophil % : x  Auto Lymphocyte % : x  Auto Monocyte % : x  Auto Eosinophil % : x  Auto Basophil % : x    02-18    138  |  107  |  13  ----------------------------<  78  4.2   |  23  |  1.0    Ca    9.5      18 Feb 2018 05:46  Mg     2.1     02-18                Neuroimaging:  NCHCT:   Patchy foci of diminished attenuation in the periventricular white   matter. These findings are nonspecific in appearance and differential   diagnostic possibilities include ischemic change of undetermined age,   foci of gliosis or demyelination.  No acute changes.        EEG: Pending Neurology Consult    Patient is a 78y old  Male who presents with a chief complaint of fall (17 Feb 2018 16:51)      HPI:  78 year old male w/ pmhx of htn, dementia lives in an elderly home with his wife - however at baseline functions mostly independently. Today was walking [facility] a [facility] - at some point he was found on the floor by a passerby who called ems. The patient himself does not recall any of this and seems to begin remembering things from when he was in the ambulance. Does not remembering falling or the events that led up to this. In the ER pt found to have abrasion of the forehead, laceration to the lip and left cheek / as well as let hand pain. He does not have any other complaints. (17 Feb 2018 16:51)    No tongue biting, bowel or bladder incontinence.  NO history of seizures.    PAST MEDICAL & SURGICAL HISTORY:  Dementia  HTN (hypertension)  S/P TKR (total knee replacement), left      FAMILY HISTORY:  No pertinent family history in first degree relatives      Social History: (-) x 3    Allergies    No Known Allergies    Intolerances        MEDICATIONS  (STANDING):  aspirin enteric coated 81 milliGRAM(s) Oral daily  atorvastatin 10 milliGRAM(s) Oral at bedtime  heparin  Injectable 5000 Unit(s) SubCutaneous every 8 hours  losartan 50 milliGRAM(s) Oral daily  pantoprazole    Tablet 40 milliGRAM(s) Oral before breakfast  sodium chloride 0.9%. 1000 milliLiter(s) (50 mL/Hr) IV Continuous <Continuous>  traMADol 50 milliGRAM(s) Oral every 12 hours    MEDICATIONS  (PRN):  ondansetron Injectable 4 milliGRAM(s) IV Push every 6 hours PRN Nausea and/or Vomiting      Review of systems:    Constitutional: No fever, weight loss or fatigue    Eyes: No eye pain or discharge  ENMT:  No difficulty hearing; No sinus or throat pain  Neck: No pain or stiffness  Respiratory: No cough, wheezing, chills or hemoptysis  Cardiovascular: No chest pain, palpitations, shortness of breath, dyspnea on exertion  Gastrointestinal: No abdominal pain, nausea, vomiting or hematemesis; No diarrhea or constipation.   Genitourinary: No dysuria, frequency, hematuria or incontinence  Neurological: As per HPI  Skin: No rashes or lesions   Endocrine: No heat or cold intolerance; No hair loss  Musculoskeletal: No joint pain or swelling  Psychiatric: No depression, anxiety, mood swings  Heme/Lymph: No easy bruising or bleeding gums    Vital Signs Last 24 Hrs  T(C): 36.4 (19 Feb 2018 21:07), Max: 36.4 (19 Feb 2018 13:42)  T(F): 97.6 (19 Feb 2018 21:07), Max: 97.6 (19 Feb 2018 13:42)  HR: 70 (19 Feb 2018 13:42) (70 - 72)  BP: 183/84 (19 Feb 2018 21:07) (179/79 - 189/75)  BP(mean): --  RR: 20 (19 Feb 2018 21:07) (17 - 20)  SpO2: --    Neurologic Examination:  General:  Appearance is consistent with chronologic age.  No abnormal facies.   General: The patient is oriented to person, place, time and date.  Recent and remote memory intact.  Fund of knowledge is intact and normal.  Language with normal repetition, comprehension and naming.  Nondysarthric.    Cranial nerves: intact VA, VFF.  EOMI w/o nystagmus, skew or reported double vision.  PERRL.  No ptosis/weakness of eyelid closure.  Facial sensation is normal with normal bite.  No facial asymmetry.  Hearing grossly intact b/l.  Palate elevates midline.  Tongue midline.  Motor examination:   Normal tone, bulk and range of motion.  No tenderness, twitching, tremors or involuntary movements.  Formal Muscle Strength Testing: (MRC grade R/L) 5/5 UE; 5/5 LE.  No observable drift.  Reflexes:   2+ b/l pectoralis, biceps, triceps, brachioradialis, patella and Achilles.  Plantar response downgoing b/l.  Jaw jerk, Hugo, clonus absent.  Sensory examination:   Intact to light touch and pinprick, pain, temperature and proprioception and vibration in all extremities.  Cerebellum:   FTN/HKS intact with normal EDIN in all limbs.  No dysmetria or dysdiadokinesia.  Gait narrow based and normal.    Labs:   CBC Full  -  ( 18 Feb 2018 05:46 )  WBC Count : 6.46 K/uL  Hemoglobin : 12.3 g/dL  Hematocrit : 38.7 %  Platelet Count - Automated : 218 K/uL  Mean Cell Volume : 77.9 fL  Mean Cell Hemoglobin : 24.7 pg  Mean Cell Hemoglobin Concentration : 31.8 g/dL  Auto Neutrophil # : x  Auto Lymphocyte # : x  Auto Monocyte # : x  Auto Eosinophil # : x  Auto Basophil # : x  Auto Neutrophil % : x  Auto Lymphocyte % : x  Auto Monocyte % : x  Auto Eosinophil % : x  Auto Basophil % : x    02-18    138  |  107  |  13  ----------------------------<  78  4.2   |  23  |  1.0    Ca    9.5      18 Feb 2018 05:46  Mg     2.1     02-18                Neuroimaging:  NCHCT:   Patchy foci of diminished attenuation in the periventricular white   matter. These findings are nonspecific in appearance and differential   diagnostic possibilities include ischemic change of undetermined age,   foci of gliosis or demyelination.  No acute changes.        EEG: Pending

## 2018-02-20 NOTE — PHYSICAL THERAPY INITIAL EVALUATION ADULT - SPECIFY REASON(S)
Pt is supervision with all functional mobility and can cont amb with NSG as tolerated.;Pt does not require skilled b/s PT intervention at this time.

## 2018-02-20 NOTE — CONSULT NOTE ADULT - ASSESSMENT
Assessment:  This is a 78y Male with likely syncope but poor recollection of events surrounding this due to preexisting mild dementia.   Will need EEG and cardiac monitoring.    Plan:   1.  Telemetry.  2.  EEG.  3.  No driving or tub baths.    02-20-18 @ 01:37 Assessment:  This is a 78y Male with likely syncope but poor recollection of events surrounding this due to preexisting mild dementia.   Will need EEG and cardiac monitoring.    Plan:   1.  Telemetry.  2.  EEG.  3.  No driving or tub baths.  4.  Consider alternative to tramadol since this is epileptogenic.    02-20-18 @ 01:37

## 2018-02-20 NOTE — CONSULT NOTE ADULT - ASSESSMENT
IMPRESSION: Rehab of gait dysfunction    PRECAUTIONS: [  ] Cardiac  [  ] Respiratory  [  ] Seizures [  ] Contact Isolation  [  ] Droplet Isolation  [  ] Other    Weight Bearing Status:     RECOMMENDATION:    Out of Bed to Chair     DVT/Decubiti Prophylaxis    REHAB PLAN:     [ x  ] Bedside P/T 3-5 times a week   [   ]   Bedside O/T  2-3 times a week             [   ] No Rehab Therapy Indicated                   [   ]  Speech Therapy   Conditioning/ROM                                    ADL  Bed Mobility                                               Conditioning/ROM  Transfers                                                     Bed Mobility  Sitting /Standing Balance                         Transfers                                        Gait Training                                               Sitting/Standing Balance  Stair Training [   ]Applicable                    Home equipment Eval                                                                        Splinting  [   ] Only      GOALS:   ADL   [x   ]   Independent                    Transfers  [ x  ] Independent                          Ambulation  [ x  ] Independent     [  x  ] With device                            [   ]  CG                                                         [   ]  CG                                                                  [   ] CG                            [    ] Min A                                                   [   ] Min A                                                              [   ] Min  A          DISCHARGE PLAN:   [   ]  Good candidate for Intensive Rehabilitation/Hospital based-4A [facility]                                             Will tolerate 3hrs Intensive Rehab Daily                                       [    ]  Short Term Rehab in Skilled Nursing Facility                                       [  x  ]  Home with Outpatient or VN services                                         [    ]  Possible Candidate for Intensive Hospital based Rehab

## 2018-02-20 NOTE — PROGRESS NOTE ADULT - ASSESSMENT
78 year old male w/ pmhx of htn, dementia lives in an elderly home with his wife - however at baseline functions mostly independently. [facility] was [facility] a [facility] - at some point he was found on the floor by a passerby who called ems.      1.Syncope  -- Pt was evaluated by neurology recommend EEG.  - MRA head and neck pending  - orthostatics+  - HCTZ discontinued.  - F/u  UA.  - PT eval - home  2. Gastritis resolved  - protonix, zofran prn  3. HTN   ct losartan  4. Dyslipidemia  - ct statin .    5. GI/ DVT prophylaxis.    Patients family was updated.

## 2018-02-21 ENCOUNTER — TRANSCRIPTION ENCOUNTER (OUTPATIENT)
Age: 78
End: 2018-02-21

## 2018-02-21 RX ORDER — LOSARTAN POTASSIUM 100 MG/1
50 TABLET, FILM COATED ORAL AT BEDTIME
Qty: 0 | Refills: 0 | Status: DISCONTINUED | OUTPATIENT
Start: 2018-02-22 | End: 2018-02-22

## 2018-02-21 RX ORDER — PREGABALIN 225 MG/1
1000 CAPSULE ORAL DAILY
Qty: 0 | Refills: 0 | Status: DISCONTINUED | OUTPATIENT
Start: 2018-02-21 | End: 2018-02-22

## 2018-02-21 RX ADMIN — PREGABALIN 1000 MICROGRAM(S): 225 CAPSULE ORAL at 18:46

## 2018-02-21 RX ADMIN — SENNA PLUS 1 TABLET(S): 8.6 TABLET ORAL at 14:42

## 2018-02-21 RX ADMIN — HEPARIN SODIUM 5000 UNIT(S): 5000 INJECTION INTRAVENOUS; SUBCUTANEOUS at 14:42

## 2018-02-21 RX ADMIN — HEPARIN SODIUM 5000 UNIT(S): 5000 INJECTION INTRAVENOUS; SUBCUTANEOUS at 05:57

## 2018-02-21 RX ADMIN — HEPARIN SODIUM 5000 UNIT(S): 5000 INJECTION INTRAVENOUS; SUBCUTANEOUS at 21:34

## 2018-02-21 RX ADMIN — Medication 81 MILLIGRAM(S): at 12:23

## 2018-02-21 RX ADMIN — SENNA PLUS 1 TABLET(S): 8.6 TABLET ORAL at 21:34

## 2018-02-21 RX ADMIN — LOSARTAN POTASSIUM 50 MILLIGRAM(S): 100 TABLET, FILM COATED ORAL at 05:57

## 2018-02-21 RX ADMIN — PANTOPRAZOLE SODIUM 40 MILLIGRAM(S): 20 TABLET, DELAYED RELEASE ORAL at 07:18

## 2018-02-21 RX ADMIN — SENNA PLUS 1 TABLET(S): 8.6 TABLET ORAL at 05:57

## 2018-02-21 RX ADMIN — Medication 100 MILLIGRAM(S): at 12:23

## 2018-02-21 RX ADMIN — ATORVASTATIN CALCIUM 10 MILLIGRAM(S): 80 TABLET, FILM COATED ORAL at 21:34

## 2018-02-21 NOTE — PROGRESS NOTE ADULT - ASSESSMENT
78 year old male w/ pmhx of htn, dementia lives in an elderly home with his wife - however at baseline functions mostly independently. Today was walking to a [facility] - at some point he was found on the floor by a passerby who called ems.      1.Syncope   neurology:  EEG and CTH negative for pathology, d/c tramadol, no driving  - MRA head and neck pending, MRI brain P  - orthostatics+, will recheck in the afternoon, HCTZ was d/c on admission  - F/u  UA  - PT eval - home  2. Gastritis resolved  - protonix, zofran prn  3. HTN   c/w losartan  4. Dyslipidemia  - c/w statin  5. GI/ DVT prophylaxis.

## 2018-02-21 NOTE — DISCHARGE NOTE ADULT - PATIENT PORTAL LINK FT
You can access the [facility], offered by [facility], by registering with the following website: [email or web address]

## 2018-02-21 NOTE — PROGRESS NOTE ADULT - SUBJECTIVE AND OBJECTIVE BOX
SUBJECTIVE:    Patient is a 78y old  Male who presents with a chief complaint of fall (21 Feb 2018 10:21)    Currently admitted to medicine with the primary diagnosis of Syncope and collapse     Today is hospital day 4d. This morning he is resting comfortably in bed and reports no new issues or overnight events. Pt did not complain of nausea this morning.    PAST MEDICAL & SURGICAL HISTORY  PAST MEDICAL & SURGICAL HISTORY:  Dementia  HTN (hypertension)  S/P TKR (total knee replacement), left    SOCIAL HISTORY:    ALLERGIES:  No Known Allergies    MEDICATIONS:  STANDING MEDICATIONS  aspirin enteric coated 81 milliGRAM(s) Oral daily  atorvastatin 10 milliGRAM(s) Oral at bedtime  docusate sodium 100 milliGRAM(s) Oral daily  heparin  Injectable 5000 Unit(s) SubCutaneous every 8 hours  pantoprazole    Tablet 40 milliGRAM(s) Oral before breakfast  senna 1 Tablet(s) Oral three times a day    PRN MEDICATIONS  ondansetron Injectable 4 milliGRAM(s) IV Push every 6 hours PRN    VITALS:   T(F): 96.6  HR: 65  BP: 168/79  RR: 18  SpO2: 96%            RADIOLOGY:  NCHCT:   Patchy foci of diminished attenuation in the periventricular white   matter. These findings are nonspecific in appearance and differential   diagnostic possibilities include ischemic change of undetermined age,   foci of gliosis or demyelination.  No acute changes.    EEG: normal    PHYSICAL EXAM:  HEENT: abrasions to the forehead.  EOMI.  Chest: clear.  CVS: SIS2 +, no murmur.  P/A: Soft, BS+  CNS: non focal.  Ext: no edema feet.  Skin: brasions to the forehead and  left hand, laceration to the left cheek and to the upper lip

## 2018-02-21 NOTE — DISCHARGE NOTE ADULT - MEDICATION SUMMARY - MEDICATIONS TO TAKE
I will START or STAY ON the medications listed below when I get home from the hospital:    aspirin 81 mg oral delayed release tablet  -- 1 tab(s) by mouth once a day  -- Indication: For cardioprotective    atorvastatin 10 mg oral tablet  -- 1 tab(s) by mouth once a day  -- Indication: For DLD    losartan-hydrochlorothiazide 50mg-12.5mg oral tablet  -- 1 tab(s) by mouth once a day  -- Indication: For HTN (hypertension)    omeprazole 40 mg oral delayed release capsule  -- 1 cap(s) by mouth once a day  -- Indication: For abdominal pain I will START or STAY ON the medications listed below when I get home from the hospital:    aspirin 81 mg oral delayed release tablet  -- 1 tab(s) by mouth once a day  -- Indication: For cardioprotective    losartan 50 mg oral tablet  -- 1 tab(s) by mouth once a day (at bedtime)  -- Indication: For HTN (hypertension)    atorvastatin 10 mg oral tablet  -- 1 tab(s) by mouth once a day  -- Indication: For DLD    omeprazole 40 mg oral delayed release capsule  -- 1 cap(s) by mouth once a day  -- Indication: For abdominal pain

## 2018-02-21 NOTE — DISCHARGE NOTE ADULT - PLAN OF CARE
prevent complications EEG testing was negative, MRI brain, head and neck was negative for any abnormalities, follow-up outpatient with primary care doctor in 1 week. continue vitamin b12 shots outpatient with PMD continue with home meds, statin, follow-up with primary doctor in 1 week continue losartan instead of home medication,  follow-up with primary doctor in 1 week

## 2018-02-21 NOTE — DISCHARGE NOTE ADULT - HOSPITAL COURSE
1. Syncope/ s/p fall with trauma  Pt with positive orthostatics on admission and HCTZ was stopped on admission, patient will continue his OP blood pressure medications  PO hydration encouraged  MRI of brain and MRA of head/neck were negative for any abnormalities  EEG - normal  Tele - no events  2D Echo - normal EF    2. HTN on Losartan during hospital course but will go back on his home meds on discharge    3. Dyslipidemia - on statin    4. Vit B12 deficiency - start B12 injections and continue as an outpt    5. DVT prophylaxis 1. Syncope/ s/p fall with trauma  Pt with positive orthostatics on admission and HCTZ was stopped on admission, patient will continue his OP blood pressure medications  PO hydration encouraged  MRI of brain and MRA of head/neck were negative for any abnormalities  EEG - normal  Tele - no events  2D Echo - normal EF    2. HTN on Losartan during hospital course and will continue at home     3. Dyslipidemia - on statin    4. Vit B12 deficiency - start B12 injections and continue as an outpt 1. Syncope/ s/p fall with trauma  Pt with positive orthostatics on admission and HCTZ was stopped on admission.  PO hydration encouraged  MRI of brain and MRA of head/neck were negative for any abnormalities  EEG - normal  Tele - no events  2D Echo - normal EF    2. HTN on Losartan during hospital course and will continue at home     3. Dyslipidemia - on statin    4. Vit B12 deficiency - start B12 injections and continue as an outpt

## 2018-02-21 NOTE — DISCHARGE NOTE ADULT - CARE PLAN
Principal Discharge DX:	Syncope and collapse  Goal:	prevent complications  Assessment and plan of treatment:	EEG testing was negative, MRI brain, head and neck was negative for any abnormalities, follow-up outpatient with primary care doctor in 1 week. continue vitamin b12 shots outpatient with PMD  Secondary Diagnosis:	Hypertension, unspecified type  Goal:	prevent complications  Assessment and plan of treatment:	continue with home meds, statin, follow-up with primary doctor in 1 week Principal Discharge DX:	Syncope and collapse  Goal:	prevent complications  Assessment and plan of treatment:	EEG testing was negative, MRI brain, head and neck was negative for any abnormalities, follow-up outpatient with primary care doctor in 1 week. continue vitamin b12 shots outpatient with PMD  Secondary Diagnosis:	Hypertension, unspecified type  Goal:	prevent complications  Assessment and plan of treatment:	continue losartan instead of home medication,  follow-up with primary doctor in 1 week

## 2018-02-21 NOTE — PROGRESS NOTE ADULT - ASSESSMENT
1. Syncope/ s/p fall with trauma  Pt with positive orthostatics on admission and HCTZ was stopped  Today, pt still with orthostatic changes but pt asymptomatic  Change Losartan to QHS  PO hydration  repeat orthostatics today  LADARIUS stockings when OOB  MRI of brain and MRA of head/neck ordered  EEG - normal  Tele - no events  2D Echo - normal EF    2. HTN on Losartan    3. Dyslipidemia - on statin    4. Vit B12 deficiency - start B12 injections and continue as an outpt    5. DVT prophylaxis

## 2018-02-21 NOTE — PROGRESS NOTE ADULT - SUBJECTIVE AND OBJECTIVE BOX
MIKE WATERS  78y Male    INTERVAL HPI/OVERNIGHT EVENTS:    Pt feels well per family at bedside.  No complaints.   He is ambulating - no dizziness  Wants to go home.     T(F): 96.6 (02-21-18 @ 06:13), Max: 98 (02-20-18 @ 14:03)  HR: 65 (02-21-18 @ 06:13) (65 - 71)  BP: 168/79 (02-20-18 @ 19:43) (137/65 - 168/79)  RR: 18 (02-21-18 @ 06:13) (16 - 18)  SpO2: 96% (02-20-18 @ 15:23) (96% - 96%)  I&O's Summary    20 Feb 2018 07:01  -  21 Feb 2018 07:00  --------------------------------------------------------  IN: 0 mL / OUT: 400 mL / NET: -400 mL      PHYSICAL EXAM:  GENERAL: NAD  EYES:   left periorbital ecchymosis  ENMT: Moist mucous membranes  NECK: Supple, No JVD  NERVOUS SYSTEM:  Alert, awake, Good concentration  CHEST/LUNG: Clear to percussion bilaterally; No rales, rhonchi, wheezing  HEART: Regular rate and rhythm; No murmurs  ABDOMEN: Soft, Nontender, Nondistended; Bowel sounds present  EXTREMITIES:  No edema  Left hand with dressing    Consultant(s) Notes Reviewed:  [x ] YES  [ ] NO  Care Discussed with Other Providers on rounds [ x] YES  [ ] NO    Medications reviewed  EKG reviewed  Telemetry reviewed    RADIOLOGY & ADDITIONAL TESTS:  report Personally Reviewed:  [x ] YES  [ ] NO    Case discussed with resident    Care discussed with pt/family    < from: EEG (02.20.18 @ 00:00) >  Impression  Normal    < end of copied text >

## 2018-02-22 VITALS — TEMPERATURE: 96 F | HEART RATE: 75 BPM

## 2018-02-22 LAB
ANION GAP SERPL CALC-SCNC: 6 MMOL/L — LOW (ref 7–14)
BUN SERPL-MCNC: 23 MG/DL — HIGH (ref 10–20)
CALCIUM SERPL-MCNC: 9.8 MG/DL — SIGNIFICANT CHANGE UP (ref 8.5–10.1)
CHLORIDE SERPL-SCNC: 106 MMOL/L — SIGNIFICANT CHANGE UP (ref 98–110)
CO2 SERPL-SCNC: 26 MMOL/L — SIGNIFICANT CHANGE UP (ref 17–32)
CREAT SERPL-MCNC: 1.2 MG/DL — SIGNIFICANT CHANGE UP (ref 0.7–1.5)
GLUCOSE SERPL-MCNC: 85 MG/DL — SIGNIFICANT CHANGE UP (ref 70–110)
HCT VFR BLD CALC: 37.9 % — LOW (ref 42–52)
HGB BLD-MCNC: 11.9 G/DL — LOW (ref 14–18)
MAGNESIUM SERPL-MCNC: 2.2 MG/DL — SIGNIFICANT CHANGE UP (ref 1.8–2.4)
MCHC RBC-ENTMCNC: 24.6 PG — LOW (ref 27–31)
MCHC RBC-ENTMCNC: 31.4 G/DL — LOW (ref 32–37)
MCV RBC AUTO: 78.3 FL — LOW (ref 80–94)
NRBC # BLD: 0 /100 WBCS — SIGNIFICANT CHANGE UP (ref 0–0)
PLATELET # BLD AUTO: 223 K/UL — SIGNIFICANT CHANGE UP (ref 130–400)
POTASSIUM SERPL-MCNC: 4.6 MMOL/L — SIGNIFICANT CHANGE UP (ref 3.5–5)
POTASSIUM SERPL-SCNC: 4.6 MMOL/L — SIGNIFICANT CHANGE UP (ref 3.5–5)
RBC # BLD: 4.84 M/UL — SIGNIFICANT CHANGE UP (ref 4.7–6.1)
RBC # FLD: 14.6 % — HIGH (ref 11.5–14.5)
SODIUM SERPL-SCNC: 138 MMOL/L — SIGNIFICANT CHANGE UP (ref 135–146)
WBC # BLD: 5.47 K/UL — SIGNIFICANT CHANGE UP (ref 4.8–10.8)
WBC # FLD AUTO: 5.47 K/UL — SIGNIFICANT CHANGE UP (ref 4.8–10.8)

## 2018-02-22 RX ORDER — LOSARTAN/HYDROCHLOROTHIAZIDE 100MG-25MG
1 TABLET ORAL
Qty: 0 | Refills: 0 | COMMUNITY

## 2018-02-22 RX ORDER — LOSARTAN POTASSIUM 100 MG/1
1 TABLET, FILM COATED ORAL
Qty: 0 | Refills: 0 | DISCHARGE
Start: 2018-02-22

## 2018-02-22 RX ORDER — ASPIRIN/CALCIUM CARB/MAGNESIUM 324 MG
1 TABLET ORAL
Qty: 0 | Refills: 0 | DISCHARGE
Start: 2018-02-22

## 2018-02-22 RX ORDER — TRAMADOL HYDROCHLORIDE 50 MG/1
50 TABLET ORAL
Qty: 0 | Refills: 0 | COMMUNITY

## 2018-02-22 RX ADMIN — SENNA PLUS 1 TABLET(S): 8.6 TABLET ORAL at 05:07

## 2018-02-22 RX ADMIN — PREGABALIN 1000 MICROGRAM(S): 225 CAPSULE ORAL at 11:57

## 2018-02-22 RX ADMIN — HEPARIN SODIUM 5000 UNIT(S): 5000 INJECTION INTRAVENOUS; SUBCUTANEOUS at 05:07

## 2018-02-22 RX ADMIN — PANTOPRAZOLE SODIUM 40 MILLIGRAM(S): 20 TABLET, DELAYED RELEASE ORAL at 11:56

## 2018-02-22 RX ADMIN — Medication 100 MILLIGRAM(S): at 11:57

## 2018-02-22 RX ADMIN — Medication 81 MILLIGRAM(S): at 11:58

## 2018-02-26 PROBLEM — I10 ESSENTIAL (PRIMARY) HYPERTENSION: Chronic | Status: ACTIVE | Noted: 2018-02-17

## 2018-02-26 PROBLEM — F03.90 UNSPECIFIED DEMENTIA, UNSPECIFIED SEVERITY, WITHOUT BEHAVIORAL DISTURBANCE, PSYCHOTIC DISTURBANCE, MOOD DISTURBANCE, AND ANXIETY: Chronic | Status: ACTIVE | Noted: 2018-02-17

## 2018-02-26 PROBLEM — F03.90 UNSPECIFIED DEMENTIA WITHOUT BEHAVIORAL DISTURBANCE: Chronic | Status: ACTIVE | Noted: 2018-02-17

## 2018-02-27 DIAGNOSIS — E53.8 DEFICIENCY OF OTHER SPECIFIED B GROUP VITAMINS: ICD-10-CM

## 2018-02-27 DIAGNOSIS — S60.512A ABRASION OF LEFT HAND, INITIAL ENCOUNTER: ICD-10-CM

## 2018-02-27 DIAGNOSIS — F03.90 UNSPECIFIED DEMENTIA WITHOUT BEHAVIORAL DISTURBANCE: ICD-10-CM

## 2018-02-27 DIAGNOSIS — S00.81XA ABRASION OF OTHER PART OF HEAD, INITIAL ENCOUNTER: ICD-10-CM
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
## 2018-02-27 DIAGNOSIS — I10 ESSENTIAL (PRIMARY) HYPERTENSION: ICD-10-CM

## 2018-02-27 DIAGNOSIS — R55 SYNCOPE AND COLLAPSE: ICD-10-CM

## 2018-02-27 DIAGNOSIS — S09.8XXA OTHER SPECIFIED INJURIES OF HEAD, INITIAL ENCOUNTER: ICD-10-CM

## 2018-02-27 DIAGNOSIS — S01.511A LACERATION WITHOUT FOREIGN BODY OF LIP, INITIAL ENCOUNTER: ICD-10-CM

## 2018-02-27 DIAGNOSIS — S01.412A LACERATION WITHOUT FOREIGN BODY OF LEFT CHEEK AND TEMPOROMANDIBULAR AREA, INITIAL ENCOUNTER: ICD-10-CM
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
## 2018-02-27 DIAGNOSIS — Z96.652 PRESENCE OF LEFT ARTIFICIAL KNEE JOINT: ICD-10-CM

## 2018-02-27 DIAGNOSIS — K29.70 GASTRITIS, UNSPECIFIED, WITHOUT BLEEDING: ICD-10-CM
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
## 2018-03-07 ENCOUNTER — OUTPATIENT (OUTPATIENT)
Dept: OUTPATIENT SERVICES | Facility: HOSPITAL | Age: 78
LOS: 1 days | Discharge: HOME | End: 2018-03-07

## 2018-03-07 ENCOUNTER — APPOINTMENT (OUTPATIENT)
Dept: INTERNAL MEDICINE | Facility: CLINIC | Age: 78
End: 2018-03-07

## 2018-03-07 VITALS
BODY MASS INDEX: 25.34 KG/M2 | HEIGHT: 63 IN | DIASTOLIC BLOOD PRESSURE: 75 MMHG | SYSTOLIC BLOOD PRESSURE: 151 MMHG | HEART RATE: 60 BPM | WEIGHT: 143 LBS

## 2018-03-07 DIAGNOSIS — Z96.652 PRESENCE OF LEFT ARTIFICIAL KNEE JOINT: Chronic | ICD-10-CM

## 2018-03-07 DIAGNOSIS — K08.89 OTHER SPECIFIED DISORDERS OF TEETH AND SUPPORTING STRUCTURES: ICD-10-CM
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
## 2018-03-07 DIAGNOSIS — R41.82 ALTERED MENTAL STATUS, UNSPECIFIED: ICD-10-CM

## 2018-03-07 RX ORDER — CALCIUM CARBONATE/VITAMIN D3 600 MG-20
600-800 TABLET ORAL
Qty: 30 | Refills: 5 | Status: ACTIVE | COMMUNITY
Start: 2018-03-07 | End: 1900-01-01

## 2018-03-07 RX ORDER — MUPIROCIN 2 G/100G
2 CREAM TOPICAL
Qty: 1 | Refills: 0 | Status: ACTIVE | COMMUNITY
Start: 2018-03-07 | End: 1900-01-01

## 2018-03-08 DIAGNOSIS — E78.5 HYPERLIPIDEMIA, UNSPECIFIED: ICD-10-CM

## 2018-03-08 DIAGNOSIS — K29.70 GASTRITIS, UNSPECIFIED, WITHOUT BLEEDING: ICD-10-CM

## 2018-03-08 DIAGNOSIS — I10 ESSENTIAL (PRIMARY) HYPERTENSION: ICD-10-CM

## 2018-03-08 DIAGNOSIS — Z00.01 ENCOUNTER FOR GENERAL ADULT MEDICAL EXAMINATION WITH ABNORMAL FINDINGS: ICD-10-CM

## 2018-04-10 LAB
25(OH)D3 SERPL-MCNC: NORMAL
CHOLEST SERPL-MCNC: NORMAL
CHOLEST/HDLC SERPL: NORMAL
ESTIMATED AVERAGE GLUCOSE (SOUTH): NORMAL
ESTIMATED AVERAGE GLUCOSE: NORMAL
ESTIMATED AVERGAGE GLUCOSE (NORTH): NORMAL
HBA1C MFR BLD HPLC: NORMAL
HBA1C MFR BLD: NORMAL
HDL CHOLESTEROL: NORMAL
HDLC SERPL-MCNC: NORMAL
HDLC SERPL: NORMAL
HDLC SERPL: NORMAL
LDL CHOLESTEROL CALC: NORMAL
LDLC SERPL CALC-MCNC: NORMAL
LDLC SERPL DIRECT ASSAY-MCNC: NORMAL
LDLC SERPL DIRECT ASSAY-MCNC: NORMAL
TRIGL SERPL-MCNC: NORMAL
TRIGLYCERIDES: NORMAL
VITAMIN D, 25-HYDROXY: NORMAL
VITAMIN D2 SERPL-MCNC: NORMAL
VITAMIN D2 SERPL-MCNC: NORMAL
VITAMIN D3 SERPL-MCNC: NORMAL
VITAMIN D3 SERPL-MCNC: NORMAL
VLDL CHOLESTEROL CAL: NORMAL
VLDLC SERPL-MCNC: NORMAL
VLDLC SERPL-MCNC: NORMAL

## 2018-05-04 ENCOUNTER — OUTPATIENT (OUTPATIENT)
Dept: OUTPATIENT SERVICES | Facility: HOSPITAL | Age: 78
LOS: 1 days | Discharge: HOME | End: 2018-05-04

## 2018-05-04 ENCOUNTER — APPOINTMENT (OUTPATIENT)
Dept: GASTROENTEROLOGY | Facility: CLINIC | Age: 78
End: 2018-05-04

## 2018-05-04 VITALS
BODY MASS INDEX: 25.69 KG/M2 | HEIGHT: 63 IN | HEART RATE: 66 BPM | DIASTOLIC BLOOD PRESSURE: 81 MMHG | SYSTOLIC BLOOD PRESSURE: 171 MMHG | WEIGHT: 145 LBS

## 2018-05-04 DIAGNOSIS — Z96.652 PRESENCE OF LEFT ARTIFICIAL KNEE JOINT: Chronic | ICD-10-CM

## 2018-05-04 DIAGNOSIS — Z00.00 ENCOUNTER FOR GENERAL ADULT MEDICAL EXAMINATION W/OUT ABNORMAL FINDINGS: ICD-10-CM

## 2018-05-04 RX ORDER — POLYETHYLENE GLYCOL 3350 AND ELECTROLYTES WITH LEMON FLAVOR 236; 22.74; 6.74; 5.86; 2.97 G/4L; G/4L; G/4L; G/4L; G/4L
236 POWDER, FOR SOLUTION ORAL
Qty: 4000 | Refills: 0 | Status: ACTIVE | COMMUNITY
Start: 2018-05-04 | End: 1900-01-01

## 2018-05-15 DIAGNOSIS — R19.4 CHANGE IN BOWEL HABIT: ICD-10-CM

## 2018-06-19 ENCOUNTER — FORM ENCOUNTER (OUTPATIENT)
Age: 78
End: 2018-06-19

## 2018-06-20 ENCOUNTER — APPOINTMENT (OUTPATIENT)
Dept: INTERNAL MEDICINE | Facility: CLINIC | Age: 78
End: 2018-06-20

## 2018-06-20 ENCOUNTER — OUTPATIENT (OUTPATIENT)
Dept: OUTPATIENT SERVICES | Facility: HOSPITAL | Age: 78
LOS: 1 days | Discharge: HOME | End: 2018-06-20

## 2018-06-20 VITALS
SYSTOLIC BLOOD PRESSURE: 157 MMHG | TEMPERATURE: 97.8 F | WEIGHT: 141 LBS | HEART RATE: 76 BPM | HEIGHT: 63 IN | DIASTOLIC BLOOD PRESSURE: 80 MMHG | BODY MASS INDEX: 24.98 KG/M2

## 2018-06-20 DIAGNOSIS — E78.5 HYPERLIPIDEMIA, UNSPECIFIED: ICD-10-CM

## 2018-06-20 DIAGNOSIS — Z96.652 PRESENCE OF LEFT ARTIFICIAL KNEE JOINT: Chronic | ICD-10-CM

## 2018-06-20 DIAGNOSIS — M79.604 PAIN IN RIGHT LEG: ICD-10-CM

## 2018-06-20 DIAGNOSIS — Z87.2 PERSONAL HISTORY OF DISEASES OF THE SKIN AND SUBCUTANEOUS TISSUE: ICD-10-CM

## 2018-06-20 DIAGNOSIS — I10 ESSENTIAL (PRIMARY) HYPERTENSION: ICD-10-CM

## 2018-06-20 DIAGNOSIS — E53.8 DEFICIENCY OF OTHER SPECIFIED B GROUP VITAMINS: ICD-10-CM

## 2018-06-20 DIAGNOSIS — E55.9 VITAMIN D DEFICIENCY, UNSPECIFIED: ICD-10-CM

## 2018-06-20 RX ORDER — ASPIRIN 81 MG/1
81 TABLET, CHEWABLE ORAL DAILY
Qty: 30 | Refills: 5 | Status: ACTIVE | COMMUNITY
Start: 2018-03-07 | End: 1900-01-01

## 2018-06-20 RX ORDER — LOSARTAN POTASSIUM 50 MG/1
50 TABLET, FILM COATED ORAL DAILY
Qty: 30 | Refills: 5 | Status: ACTIVE | COMMUNITY
Start: 2018-03-07 | End: 1900-01-01

## 2018-06-20 RX ORDER — ATORVASTATIN CALCIUM 10 MG/1
10 TABLET, FILM COATED ORAL
Qty: 30 | Refills: 5 | Status: ACTIVE | COMMUNITY
Start: 2018-03-07 | End: 1900-01-01

## 2018-06-20 RX ORDER — CHOLECALCIFEROL (VITAMIN D3) 1250 MCG
1.25 MG CAPSULE ORAL
Qty: 6 | Refills: 0 | Status: DISCONTINUED | COMMUNITY
Start: 2018-04-09 | End: 2018-06-20

## 2018-06-20 RX ORDER — OMEPRAZOLE 40 MG/1
40 CAPSULE, DELAYED RELEASE ORAL
Qty: 30 | Refills: 5 | Status: ACTIVE | COMMUNITY
Start: 2018-03-07 | End: 1900-01-01

## 2018-06-20 NOTE — ASSESSMENT
[FreeTextEntry1] : A 78 y old man with PMHx of HTN, DLD, vitamin b12 def, vit d deficiency presenting for follow up.\par \par # R leg pain\par likely musculoskeletal\par will get R leg xray\par \par # HTN on Losartan\par \par # Dyslipidemia \par LDL 68, HDL 49 in Mar 2018\par cont Lipitor \par \par # Vitamin B12 deficiency on vitamin b12 \par will repeat vit b12 level was 163 in feb 2018\par \par # vit d def\par vit d was 16 in apr 2018 after which he was given vit d 19499y/week\par will recheck level\par \par # HCM\par  scheduled for screening colonoscopy on 8/1/2018\par \par Will repeat cbc, cmp, vit d, vit b12

## 2018-06-20 NOTE — HISTORY OF PRESENT ILLNESS
[de-identified] : A 78 y old man with PMHx of HTN, DLD, vitamin b12 def, vit d deficiency presenting for follow up.\par Patient currently complains of intermittent right leg pain, he denies any knee pain or trauma to the leg. He ambulates using a walker.\par Currently he still reports constipation but denies diarrhea, hematochezia, melena, vomiting, abdominal pain, nausea, weight loss or any family hx of colon cancer.\par He is scheduled for colonoscopy by GI on 8/1/2018.

## 2018-06-21 DIAGNOSIS — E78.5 HYPERLIPIDEMIA, UNSPECIFIED: ICD-10-CM

## 2018-06-21 DIAGNOSIS — I10 ESSENTIAL (PRIMARY) HYPERTENSION: ICD-10-CM

## 2018-06-21 DIAGNOSIS — K29.70 GASTRITIS, UNSPECIFIED, WITHOUT BLEEDING: ICD-10-CM

## 2018-07-03 ENCOUNTER — APPOINTMENT (OUTPATIENT)
Dept: NEUROLOGY | Facility: CLINIC | Age: 78
End: 2018-07-03

## 2018-07-30 ENCOUNTER — MESSAGE (OUTPATIENT)
Age: 78
End: 2018-07-30

## 2018-08-15 ENCOUNTER — EMERGENCY (EMERGENCY)
Facility: HOSPITAL | Age: 78
LOS: 0 days | Discharge: LEFT BEFORE DISCHARGE | End: 2018-08-15
Attending: EMERGENCY MEDICINE | Admitting: EMERGENCY MEDICINE

## 2018-08-15 VITALS
TEMPERATURE: 99 F | HEART RATE: 68 BPM | RESPIRATION RATE: 18 BRPM | DIASTOLIC BLOOD PRESSURE: 73 MMHG | OXYGEN SATURATION: 96 % | SYSTOLIC BLOOD PRESSURE: 160 MMHG

## 2018-08-15 VITALS
HEART RATE: 68 BPM | DIASTOLIC BLOOD PRESSURE: 91 MMHG | RESPIRATION RATE: 18 BRPM | OXYGEN SATURATION: 97 % | SYSTOLIC BLOOD PRESSURE: 195 MMHG | TEMPERATURE: 98 F
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
## 2018-08-15 DIAGNOSIS — R29.6 REPEATED FALLS: ICD-10-CM

## 2018-08-15 DIAGNOSIS — M25.50 PAIN IN UNSPECIFIED JOINT: ICD-10-CM

## 2018-08-15 DIAGNOSIS — Z96.652 PRESENCE OF LEFT ARTIFICIAL KNEE JOINT: Chronic | ICD-10-CM

## 2018-08-15 DIAGNOSIS — R42 DIZZINESS AND GIDDINESS: ICD-10-CM

## 2018-08-15 DIAGNOSIS — I10 ESSENTIAL (PRIMARY) HYPERTENSION: ICD-10-CM

## 2018-08-15 DIAGNOSIS — R11.2 NAUSEA WITH VOMITING, UNSPECIFIED: ICD-10-CM
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
## 2018-08-15 LAB
ALBUMIN SERPL ELPH-MCNC: 3.9 G/DL — SIGNIFICANT CHANGE UP (ref 3.5–5.2)
ALBUMIN SERPL ELPH-MCNC: 4 G/DL — SIGNIFICANT CHANGE UP (ref 3.5–5.2)
ALP SERPL-CCNC: 62 U/L — SIGNIFICANT CHANGE UP (ref 30–115)
ALP SERPL-CCNC: 63 U/L — SIGNIFICANT CHANGE UP (ref 30–115)
ALT FLD-CCNC: 12 U/L — SIGNIFICANT CHANGE UP (ref 0–41)
ALT FLD-CCNC: 12 U/L — SIGNIFICANT CHANGE UP (ref 0–41)
ANION GAP SERPL CALC-SCNC: 15 MMOL/L — HIGH (ref 7–14)
ANION GAP SERPL CALC-SCNC: 17 MMOL/L — HIGH (ref 7–14)
AST SERPL-CCNC: 20 U/L — SIGNIFICANT CHANGE UP (ref 0–41)
AST SERPL-CCNC: 30 U/L — SIGNIFICANT CHANGE UP (ref 0–41)
BILIRUB SERPL-MCNC: 0.6 MG/DL — SIGNIFICANT CHANGE UP (ref 0.2–1.2)
BILIRUB SERPL-MCNC: 0.7 MG/DL — SIGNIFICANT CHANGE UP (ref 0.2–1.2)
BUN SERPL-MCNC: 11 MG/DL — SIGNIFICANT CHANGE UP (ref 10–20)
BUN SERPL-MCNC: 12 MG/DL — SIGNIFICANT CHANGE UP (ref 10–20)
CALCIUM SERPL-MCNC: 9.6 MG/DL — SIGNIFICANT CHANGE UP (ref 8.5–10.1)
CALCIUM SERPL-MCNC: 9.7 MG/DL — SIGNIFICANT CHANGE UP (ref 8.5–10.1)
CHLORIDE SERPL-SCNC: 100 MMOL/L — SIGNIFICANT CHANGE UP (ref 98–110)
CHLORIDE SERPL-SCNC: 104 MMOL/L — SIGNIFICANT CHANGE UP (ref 98–110)
CO2 SERPL-SCNC: 20 MMOL/L — SIGNIFICANT CHANGE UP (ref 17–32)
CO2 SERPL-SCNC: 21 MMOL/L — SIGNIFICANT CHANGE UP (ref 17–32)
CREAT SERPL-MCNC: 0.9 MG/DL — SIGNIFICANT CHANGE UP (ref 0.7–1.5)
CREAT SERPL-MCNC: 1 MG/DL — SIGNIFICANT CHANGE UP (ref 0.7–1.5)
GLUCOSE SERPL-MCNC: 109 MG/DL — HIGH (ref 70–99)
GLUCOSE SERPL-MCNC: 115 MG/DL — HIGH (ref 70–99)
HCT VFR BLD CALC: 40.5 % — LOW (ref 42–52)
HGB BLD-MCNC: 12.8 G/DL — LOW (ref 14–18)
MCHC RBC-ENTMCNC: 24.5 PG — LOW (ref 27–31)
MCHC RBC-ENTMCNC: 31.6 G/DL — LOW (ref 32–37)
MCV RBC AUTO: 77.4 FL — LOW (ref 80–94)
NRBC # BLD: 0 /100 WBCS — SIGNIFICANT CHANGE UP (ref 0–0)
PLATELET # BLD AUTO: 189 K/UL — SIGNIFICANT CHANGE UP (ref 130–400)
POTASSIUM SERPL-MCNC: 4.5 MMOL/L — SIGNIFICANT CHANGE UP (ref 3.5–5)
POTASSIUM SERPL-MCNC: 5.4 MMOL/L — HIGH (ref 3.5–5)
POTASSIUM SERPL-SCNC: 4.5 MMOL/L — SIGNIFICANT CHANGE UP (ref 3.5–5)
POTASSIUM SERPL-SCNC: 5.4 MMOL/L — HIGH (ref 3.5–5)
PROT SERPL-MCNC: 6.7 G/DL — SIGNIFICANT CHANGE UP (ref 6–8)
PROT SERPL-MCNC: 6.7 G/DL — SIGNIFICANT CHANGE UP (ref 6–8)
RBC # BLD: 5.23 M/UL — SIGNIFICANT CHANGE UP (ref 4.7–6.1)
RBC # FLD: 16.9 % — HIGH (ref 11.5–14.5)
SODIUM SERPL-SCNC: 137 MMOL/L — SIGNIFICANT CHANGE UP (ref 135–146)
SODIUM SERPL-SCNC: 140 MMOL/L — SIGNIFICANT CHANGE UP (ref 135–146)
TROPONIN T SERPL-MCNC: <0.01 NG/ML — SIGNIFICANT CHANGE UP
TROPONIN T SERPL-MCNC: <0.01 NG/ML — SIGNIFICANT CHANGE UP
WBC # BLD: 8.77 K/UL — SIGNIFICANT CHANGE UP (ref 4.8–10.8)
WBC # FLD AUTO: 8.77 K/UL — SIGNIFICANT CHANGE UP (ref 4.8–10.8)

## 2018-08-15 RX ORDER — SODIUM CHLORIDE 9 MG/ML
1000 INJECTION INTRAMUSCULAR; INTRAVENOUS; SUBCUTANEOUS ONCE
Qty: 0 | Refills: 0 | Status: COMPLETED | OUTPATIENT
Start: 2018-08-15 | End: 2018-08-15

## 2018-08-15 RX ORDER — ONDANSETRON 8 MG/1
4 TABLET, FILM COATED ORAL ONCE
Qty: 0 | Refills: 0 | Status: COMPLETED | OUTPATIENT
Start: 2018-08-15 | End: 2018-08-15

## 2018-08-15 RX ADMIN — SODIUM CHLORIDE 1000 MILLILITER(S): 9 INJECTION INTRAMUSCULAR; INTRAVENOUS; SUBCUTANEOUS at 13:18

## 2018-08-15 RX ADMIN — SODIUM CHLORIDE 1000 MILLILITER(S): 9 INJECTION INTRAMUSCULAR; INTRAVENOUS; SUBCUTANEOUS at 13:30

## 2018-08-15 NOTE — ED ADULT NURSE NOTE - OBJECTIVE STATEMENT
79 y/o M pt, w/ pmh of dementia, HTN, present to Ed for dizziness, nausea and fall, wife and daughter at bedside, pt fell 3 times in the last 3 days, pt gets dizzy and fall, last fall was today, neg LOC, denies hitting head, on pt A&Ox3, breathing spontaneous, unlabored without distress on room air, PERRL, hand  equal b/l, pt currently dizzy and nauseous, report right neck pain, left elbow pain, seen and eval by MD, pt taken to CT scan, pt to have heplock placed and labs drawn

## 2018-08-15 NOTE — ED ADULT NURSE NOTE - NSIMPLEMENTINTERV_GEN_ALL_ED
Implemented All Fall with Harm Risk Interventions:  Summers to call system. Call bell, personal items and telephone within reach. Instruct patient to call for assistance. Room bathroom lighting operational. Non-slip footwear when patient is off stretcher. Physically safe environment: no spills, clutter or unnecessary equipment. Stretcher in lowest position, wheels locked, appropriate side rails in place. Provide visual cue, wrist band, yellow gown, etc. Monitor gait and stability. Monitor for mental status changes and reorient to person, place, and time. Review medications for side effects contributing to fall risk. Reinforce activity limits and safety measures with patient and family. Provide visual clues: red socks.

## 2018-08-15 NOTE — ED PROVIDER NOTE - PROGRESS NOTE DETAILS
The patient wishes to leave against medical advice.  I have discussed the risks, benefits and alternatives (including the possibility of worsening of disease, pain, permanent disability, and/or death) with the patient and his daughter.  The patient and daughter voices understanding of these risks, benefits, and alternatives and still wishes to sign out against medical advice.  The patient is awake, alert, oriented  x 3 and has demonstrated capacity to refuse/direct care.  I have advised the patient that they can and should return immediately should they develop any worse/different/additional symptoms, or if they change their mind and want to continue their care.

## 2018-08-15 NOTE — ED PROVIDER NOTE - PHYSICAL EXAMINATION
VITAL SIGNS: I have reviewed nursing notes and confirm.  CONSTITUTIONAL: Elderly female laying on stretcher in NAD.   SKIN: Skin exam is warm and dry, no acute rash.  HEAD: Normocephalic; atraumatic.  EYES: PERRL, EOM intact; conjunctiva and sclera clear.  ENT: No nasal discharge; airway clear. TMs clear.  NECK: Supple; non tender.  CARD: S1, S2 normal; no murmurs, gallops, or rubs. Regular rate and rhythm.  RESP: No wheezes, rales or rhonchi. Speaking in full sentences.   ABD: Normal bowel sounds; soft; non-distended; non-tender; No rebound or guarding.   EXT: Normal ROM. No clubbing, cyanosis or edema.  NEURO: Alert, oriented. Grossly unremarkable. No focal deficits. CN II-XII intact. No dysmetria. No ataxia. Sensation intact throughout. Strength 5/5 throughout. Gait steady.

## 2018-08-15 NOTE — ED PROVIDER NOTE - OBJECTIVE STATEMENT
79 yo M with PMHx of HTN and dementia presents to the ED BIB daughter for evaluation of dizziness and frequent falls over the past three days. Dizziness is not positional and described as the room spinning. Today pt fell and hit his right shoulder. Pt denies head trauma or LOC. He had one episode of vomiting. Pt denies fever, chills, abdominal pain, diarrhea, headache, weakness, chest pain, SOB, back pain, LOC, trauma, urinary symptoms, cough, calf pain/swelling, recent travel, recent surgery.

## 2018-08-15 NOTE — ED PROVIDER NOTE - ATTENDING CONTRIBUTION TO CARE
79 y/o male with hx of HTN, dementia, presents to ED with dizziness and frequent falls.  Last fall today, with impact to right shoulder.  Witnessed by family.  No head trauma or LOC.  No CP/SOB.  No abdominal pain, fever or chills. PE:  agree with above.  A/P:  Dizziness, frequent falls.  Check CT brain, imaging of shoulder and chest.  Labs, EKG and likely admit.

## 2018-08-15 NOTE — ED ADULT TRIAGE NOTE - CHIEF COMPLAINT QUOTE
pt fell at home today , pt has been having dizziness for 3 days.  no loc. pt with nausea and vomiting.

## 2019-07-01 ENCOUNTER — OUTPATIENT (OUTPATIENT)
Dept: OUTPATIENT SERVICES | Facility: HOSPITAL | Age: 79
LOS: 1 days | End: 2019-07-01
Payer: MEDICARE

## 2019-07-01 DIAGNOSIS — Z96.652 PRESENCE OF LEFT ARTIFICIAL KNEE JOINT: Chronic | ICD-10-CM

## 2019-07-01 PROCEDURE — G9001: CPT

## 2019-07-11 ENCOUNTER — EMERGENCY (EMERGENCY)
Facility: HOSPITAL | Age: 79
LOS: 0 days | Discharge: HOME | End: 2019-07-11
Attending: STUDENT IN AN ORGANIZED HEALTH CARE EDUCATION/TRAINING PROGRAM | Admitting: STUDENT IN AN ORGANIZED HEALTH CARE EDUCATION/TRAINING PROGRAM
Payer: MEDICARE

## 2019-07-11 VITALS
RESPIRATION RATE: 16 BRPM | TEMPERATURE: 96 F | OXYGEN SATURATION: 100 % | DIASTOLIC BLOOD PRESSURE: 84 MMHG | SYSTOLIC BLOOD PRESSURE: 194 MMHG | HEART RATE: 53 BPM

## 2019-07-11 VITALS
OXYGEN SATURATION: 98 % | DIASTOLIC BLOOD PRESSURE: 91 MMHG | RESPIRATION RATE: 16 BRPM | HEART RATE: 54 BPM | SYSTOLIC BLOOD PRESSURE: 199 MMHG

## 2019-07-11 DIAGNOSIS — R42 DIZZINESS AND GIDDINESS: ICD-10-CM

## 2019-07-11 DIAGNOSIS — I10 ESSENTIAL (PRIMARY) HYPERTENSION: ICD-10-CM

## 2019-07-11 DIAGNOSIS — Z79.899 OTHER LONG TERM (CURRENT) DRUG THERAPY: ICD-10-CM

## 2019-07-11 DIAGNOSIS — R20.2 PARESTHESIA OF SKIN: ICD-10-CM

## 2019-07-11 DIAGNOSIS — Z79.82 LONG TERM (CURRENT) USE OF ASPIRIN: ICD-10-CM

## 2019-07-11 DIAGNOSIS — E78.5 HYPERLIPIDEMIA, UNSPECIFIED: ICD-10-CM

## 2019-07-11 DIAGNOSIS — R20.0 ANESTHESIA OF SKIN: ICD-10-CM

## 2019-07-11 DIAGNOSIS — Z96.652 PRESENCE OF LEFT ARTIFICIAL KNEE JOINT: Chronic | ICD-10-CM

## 2019-07-11 LAB
ALBUMIN SERPL ELPH-MCNC: 4.1 G/DL — SIGNIFICANT CHANGE UP (ref 3.5–5.2)
ALP SERPL-CCNC: 65 U/L — SIGNIFICANT CHANGE UP (ref 30–115)
ALT FLD-CCNC: 10 U/L — SIGNIFICANT CHANGE UP (ref 0–41)
ANION GAP SERPL CALC-SCNC: 10 MMOL/L — SIGNIFICANT CHANGE UP (ref 7–14)
AST SERPL-CCNC: 20 U/L — SIGNIFICANT CHANGE UP (ref 0–41)
BASOPHILS # BLD AUTO: 0.07 K/UL — SIGNIFICANT CHANGE UP (ref 0–0.2)
BASOPHILS NFR BLD AUTO: 1.2 % — HIGH (ref 0–1)
BILIRUB SERPL-MCNC: 0.5 MG/DL — SIGNIFICANT CHANGE UP (ref 0.2–1.2)
BLD GP AB SCN SERPL QL: SIGNIFICANT CHANGE UP
BUN SERPL-MCNC: 17 MG/DL — SIGNIFICANT CHANGE UP (ref 10–20)
CALCIUM SERPL-MCNC: 10 MG/DL — SIGNIFICANT CHANGE UP (ref 8.5–10.1)
CHLORIDE SERPL-SCNC: 105 MMOL/L — SIGNIFICANT CHANGE UP (ref 98–110)
CO2 SERPL-SCNC: 27 MMOL/L — SIGNIFICANT CHANGE UP (ref 17–32)
CREAT SERPL-MCNC: 1.1 MG/DL — SIGNIFICANT CHANGE UP (ref 0.7–1.5)
EOSINOPHIL # BLD AUTO: 0.1 K/UL — SIGNIFICANT CHANGE UP (ref 0–0.7)
EOSINOPHIL NFR BLD AUTO: 1.8 % — SIGNIFICANT CHANGE UP (ref 0–8)
GLUCOSE SERPL-MCNC: 96 MG/DL — SIGNIFICANT CHANGE UP (ref 70–99)
HCT VFR BLD CALC: 43.3 % — SIGNIFICANT CHANGE UP (ref 42–52)
HGB BLD-MCNC: 13.4 G/DL — LOW (ref 14–18)
IMM GRANULOCYTES NFR BLD AUTO: 0.2 % — SIGNIFICANT CHANGE UP (ref 0.1–0.3)
LIDOCAIN IGE QN: 50 U/L — SIGNIFICANT CHANGE UP (ref 7–60)
LYMPHOCYTES # BLD AUTO: 1.44 K/UL — SIGNIFICANT CHANGE UP (ref 1.2–3.4)
LYMPHOCYTES # BLD AUTO: 25.4 % — SIGNIFICANT CHANGE UP (ref 20.5–51.1)
MAGNESIUM SERPL-MCNC: 2.1 MG/DL — SIGNIFICANT CHANGE UP (ref 1.8–2.4)
MCHC RBC-ENTMCNC: 25.5 PG — LOW (ref 27–31)
MCHC RBC-ENTMCNC: 30.9 G/DL — LOW (ref 32–37)
MCV RBC AUTO: 82.5 FL — SIGNIFICANT CHANGE UP (ref 80–94)
MONOCYTES # BLD AUTO: 0.47 K/UL — SIGNIFICANT CHANGE UP (ref 0.1–0.6)
MONOCYTES NFR BLD AUTO: 8.3 % — SIGNIFICANT CHANGE UP (ref 1.7–9.3)
NEUTROPHILS # BLD AUTO: 3.57 K/UL — SIGNIFICANT CHANGE UP (ref 1.4–6.5)
NEUTROPHILS NFR BLD AUTO: 63.1 % — SIGNIFICANT CHANGE UP (ref 42.2–75.2)
NRBC # BLD: 0 /100 WBCS — SIGNIFICANT CHANGE UP (ref 0–0)
NT-PROBNP SERPL-SCNC: 230 PG/ML — SIGNIFICANT CHANGE UP (ref 0–300)
PHOSPHATE SERPL-MCNC: 3.2 MG/DL — SIGNIFICANT CHANGE UP (ref 2.1–4.9)
PLATELET # BLD AUTO: 206 K/UL — SIGNIFICANT CHANGE UP (ref 130–400)
POTASSIUM SERPL-MCNC: 5 MMOL/L — SIGNIFICANT CHANGE UP (ref 3.5–5)
POTASSIUM SERPL-SCNC: 5 MMOL/L — SIGNIFICANT CHANGE UP (ref 3.5–5)
PROT SERPL-MCNC: 7 G/DL — SIGNIFICANT CHANGE UP (ref 6–8)
RBC # BLD: 5.25 M/UL — SIGNIFICANT CHANGE UP (ref 4.7–6.1)
RBC # FLD: 14.1 % — SIGNIFICANT CHANGE UP (ref 11.5–14.5)
SODIUM SERPL-SCNC: 142 MMOL/L — SIGNIFICANT CHANGE UP (ref 135–146)
TROPONIN T SERPL-MCNC: <0.01 NG/ML — SIGNIFICANT CHANGE UP
WBC # BLD: 5.66 K/UL — SIGNIFICANT CHANGE UP (ref 4.8–10.8)
WBC # FLD AUTO: 5.66 K/UL — SIGNIFICANT CHANGE UP (ref 4.8–10.8)

## 2019-07-11 PROCEDURE — 93010 ELECTROCARDIOGRAM REPORT: CPT

## 2019-07-11 PROCEDURE — 70450 CT HEAD/BRAIN W/O DYE: CPT | Mod: 26

## 2019-07-11 PROCEDURE — 99284 EMERGENCY DEPT VISIT MOD MDM: CPT

## 2019-07-11 PROCEDURE — 71045 X-RAY EXAM CHEST 1 VIEW: CPT | Mod: 26

## 2019-07-11 RX ORDER — OMEPRAZOLE 10 MG/1
1 CAPSULE, DELAYED RELEASE ORAL
Qty: 0 | Refills: 0 | DISCHARGE

## 2019-07-11 RX ORDER — LOSARTAN POTASSIUM 100 MG/1
50 TABLET, FILM COATED ORAL DAILY
Refills: 0 | Status: DISCONTINUED | OUTPATIENT
Start: 2019-07-11 | End: 2019-07-11

## 2019-07-11 RX ORDER — SODIUM CHLORIDE 9 MG/ML
500 INJECTION INTRAMUSCULAR; INTRAVENOUS; SUBCUTANEOUS ONCE
Refills: 0 | Status: COMPLETED | OUTPATIENT
Start: 2019-07-11 | End: 2019-07-11

## 2019-07-11 RX ORDER — ATORVASTATIN CALCIUM 80 MG/1
1 TABLET, FILM COATED ORAL
Qty: 0 | Refills: 0 | DISCHARGE

## 2019-07-11 RX ORDER — METOPROLOL TARTRATE 50 MG
0 TABLET ORAL
Qty: 0 | Refills: 0 | DISCHARGE

## 2019-07-11 RX ADMIN — LOSARTAN POTASSIUM 50 MILLIGRAM(S): 100 TABLET, FILM COATED ORAL at 18:35

## 2019-07-11 NOTE — ED PROVIDER NOTE - CLINICAL SUMMARY MEDICAL DECISION MAKING FREE TEXT BOX
pt here for multiple complaints including paresthesias and lightheadedness. ED w/u w/o acute findings. neuro called given c/f possible CVA however, upon talking to daughter, pt was poorly able to communicate symptoms and state there was no numbness. pt demonstrated ability to walk in ed w/o assistanc.e

## 2019-07-11 NOTE — ED PROVIDER NOTE - OBJECTIVE STATEMENT
80 yo m hx htn, hld here for several complaints. pt endorsing weeks of lightheadedness w/ standing and exertion which has been getting worse. family states pt has significant difficulty 80 yo m hx htn, hld here for several complaints. pt endorsing weeks of lightheadedness w/ standing and exertion which has been getting worse. family states pt has significant difficulty moving around because of his lightheadedness. no falls. family also states at 2am he had full body numbness which slowly improved throughout the night. by the morning, pt only w/ RLE numbness. no f/c/cough/n/v.  [id number]

## 2019-07-11 NOTE — ED PROVIDER NOTE - NSFOLLOWUPCLINICS_GEN_ALL_ED_FT
[facility]  Neurology  [address], [address]  [place] [address]  Phone: [phone]  Fax:     [facility] [place]  Cardiology  [address] [place] [id number]  Phone: [phone]  Fax:   Follow Up Time:

## 2019-07-11 NOTE — ED PROVIDER NOTE - PROGRESS NOTE DETAILS
no acute findings on cth, LBBB old, trop neg, pt still w/ RLE numbness, d/w neuro daughter who is RN came to ED, daughter states pt is poor historian even with . she states that she sees patient frequently and he is baseline. when she asks pt about numbness he now DENIES all numbness. when she asked him to stand up he was able to stand up and walk without assistant. daughter states patient looks well to her and she would rather f/u w/ specialists as outpt and avoid complications associated w/ hospitalization. she will f/u closely. unlikely cva given daughter's recount of event and no FND. pt able to stand up and walk w/o assistance no cough, fever, chills, leukocytosis. no evidence of pna

## 2019-07-11 NOTE — ED PROVIDER NOTE - CARE PLAN
Principal Discharge DX:	Numbness  Secondary Diagnosis:	Lightheadedness Principal Discharge DX:	Paresthesia

## 2019-07-11 NOTE — ED PROVIDER NOTE - NSFOLLOWUPINSTRUCTIONS_ED_ALL_ED_FT
Follow up with cardiology and neurology within 3 days. Return for numbness, weakness, chest pain, shortness of breath, passing out. Return for concern for fall risk.

## 2019-07-11 NOTE — ED PROVIDER NOTE - NS ED ROS FT
Constitutional: no fever or rigors  Eyes: no eye redness, acute visual change  ENMT: no ear pain, no throat pain  Card: no chest pain, no palpitations, + lightheadedness on exertion   Pulm: no cough, no shortness of breath  GI: no abdominal pain, nausea or vomiting  : no dysuria or hematuria  MSK: no limitation in range of motion, no neck pain  Skin: no rash, no abrasion  Neuro: pos numbness, no weakness  Heme/Onc: no easy bruising, no bleeding tendency   Allergic: no hives, no throat swelling

## 2019-07-11 NOTE — ED PROVIDER NOTE - CARE PROVIDER_API CALL
Juwan Mckeon)  Cardiovascular Disease; Internal Medicine; Interventional Cardiology  [address] [place] [address]  Phone: [phone]  Fax: [phone]  Follow Up Time:

## 2019-07-11 NOTE — ED ADULT TRIAGE NOTE - CHIEF COMPLAINT QUOTE
Pt went to urgent care for dizziness. Pt sent to ED for STEMI evaluation. Pt with c/o dizziness, denies chest pain.

## 2019-07-11 NOTE — ED PROVIDER NOTE - PHYSICAL EXAMINATION
PHYSICAL EXAM:    Constitutional: awake, alert, NAD  Eyes: EOMI, no conj injection  HENT: NC AT  Back: no c/t/l spine ttp  Respiratory: no respiratory distress, breath sounds equal b/l, no wheezing, rhonchi or stridor.   Cardiovascular: RRR nml S1S2  Gastrointestinal: soft, no masses, nontender, nondistended. No guarding or rebound.   Extremities: no peripheral edema  Neurological: AAOx3, CN II-XII grossly intact, no focal weakness, RLE decreased sensation   Skin: no rash  Musculoskeletal: no gross deformity

## 2019-07-12 DIAGNOSIS — Z71.89 OTHER SPECIFIED COUNSELING: ICD-10-CM

## 2020-04-30 ENCOUNTER — TRANSCRIPTION ENCOUNTER (OUTPATIENT)
Age: 80
End: 2020-04-30

## 2020-05-02 ENCOUNTER — TRANSCRIPTION ENCOUNTER (OUTPATIENT)
Age: 80
End: 2020-05-02

## 2020-08-17 NOTE — H&P ADULT - ATTENDING COMMENTS
Yes Results of imaging, labs and EKG reviewed independantly  Discussed case with housestaff  Available prior records reviewed  Agree with resident note with exceptions.  Pt c/o decrease appetite with symptoms of heart burn.  1.Syncope  - orthostatics+  -Discontinue HCTZ.  - F/u B12, folate, tsh, UA.  - PT eval.  2. Gastritis  - protonix  3. HTN   ct losartan  4. Dyslipidemia  - ct statin

## 2021-10-06 PROBLEM — I10 ESSENTIAL HYPERTENSION: Status: ACTIVE | Noted: 2018-03-07

## 2023-08-09 ENCOUNTER — NON-APPOINTMENT (OUTPATIENT)
Age: 83
End: 2023-08-09

## 2023-08-10 ENCOUNTER — EMERGENCY (EMERGENCY)
Facility: HOSPITAL | Age: 83
LOS: 0 days | Discharge: HOME | End: 2023-08-10
Attending: STUDENT IN AN ORGANIZED HEALTH CARE EDUCATION/TRAINING PROGRAM
Payer: MEDICARE

## 2023-08-10 VITALS
HEART RATE: 60 BPM | DIASTOLIC BLOOD PRESSURE: 70 MMHG | OXYGEN SATURATION: 97 % | RESPIRATION RATE: 18 BRPM | SYSTOLIC BLOOD PRESSURE: 208 MMHG | WEIGHT: 134.92 LBS | TEMPERATURE: 98 F

## 2023-08-10 VITALS
RESPIRATION RATE: 17 BRPM | HEART RATE: 65 BPM | DIASTOLIC BLOOD PRESSURE: 82 MMHG | TEMPERATURE: 98 F | SYSTOLIC BLOOD PRESSURE: 181 MMHG | OXYGEN SATURATION: 98 %

## 2023-08-10 DIAGNOSIS — M54.2 CERVICALGIA: ICD-10-CM

## 2023-08-10 DIAGNOSIS — Z96.652 PRESENCE OF LEFT ARTIFICIAL KNEE JOINT: ICD-10-CM

## 2023-08-10 DIAGNOSIS — F03.90 UNSPECIFIED DEMENTIA WITHOUT BEHAVIORAL DISTURBANCE: ICD-10-CM

## 2023-08-10 DIAGNOSIS — I10 ESSENTIAL (PRIMARY) HYPERTENSION: ICD-10-CM

## 2023-08-10 DIAGNOSIS — R51.9 HEADACHE, UNSPECIFIED: ICD-10-CM

## 2023-08-10 DIAGNOSIS — Z79.82 LONG TERM (CURRENT) USE OF ASPIRIN: ICD-10-CM
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
## 2023-08-10 DIAGNOSIS — R42 DIZZINESS AND GIDDINESS: ICD-10-CM

## 2023-08-10 DIAGNOSIS — R45.1 RESTLESSNESS AND AGITATION: ICD-10-CM
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
## 2023-08-10 DIAGNOSIS — Z96.652 PRESENCE OF LEFT ARTIFICIAL KNEE JOINT: Chronic | ICD-10-CM

## 2023-08-10 LAB
ALBUMIN SERPL ELPH-MCNC: 4.2 G/DL — SIGNIFICANT CHANGE UP (ref 3.5–5.2)
ALP SERPL-CCNC: 76 U/L — SIGNIFICANT CHANGE UP (ref 30–115)
ALT FLD-CCNC: 11 U/L — SIGNIFICANT CHANGE UP (ref 0–41)
ANION GAP SERPL CALC-SCNC: 17 MMOL/L — HIGH (ref 7–14)
APPEARANCE UR: CLEAR — SIGNIFICANT CHANGE UP
AST SERPL-CCNC: 25 U/L — SIGNIFICANT CHANGE UP (ref 0–41)
BASOPHILS # BLD AUTO: 0.06 K/UL — SIGNIFICANT CHANGE UP (ref 0–0.2)
BASOPHILS NFR BLD AUTO: 1.2 % — HIGH (ref 0–1)
BILIRUB SERPL-MCNC: 0.4 MG/DL — SIGNIFICANT CHANGE UP (ref 0.2–1.2)
BILIRUB UR-MCNC: NEGATIVE — SIGNIFICANT CHANGE UP
BUN SERPL-MCNC: 21 MG/DL — HIGH (ref 10–20)
CALCIUM SERPL-MCNC: 9.9 MG/DL — SIGNIFICANT CHANGE UP (ref 8.4–10.5)
CHLORIDE SERPL-SCNC: 103 MMOL/L — SIGNIFICANT CHANGE UP (ref 98–110)
CO2 SERPL-SCNC: 19 MMOL/L — SIGNIFICANT CHANGE UP (ref 17–32)
COLOR SPEC: YELLOW — SIGNIFICANT CHANGE UP
CREAT SERPL-MCNC: 1 MG/DL — SIGNIFICANT CHANGE UP (ref 0.7–1.5)
DIFF PNL FLD: NEGATIVE — SIGNIFICANT CHANGE UP
EGFR: 74 ML/MIN/1.73M2 — SIGNIFICANT CHANGE UP
EOSINOPHIL # BLD AUTO: 0.3 K/UL — SIGNIFICANT CHANGE UP (ref 0–0.7)
EOSINOPHIL NFR BLD AUTO: 5.9 % — SIGNIFICANT CHANGE UP (ref 0–8)
ETHANOL SERPL-MCNC: <10 MG/DL — SIGNIFICANT CHANGE UP
GLUCOSE SERPL-MCNC: 87 MG/DL — SIGNIFICANT CHANGE UP (ref 70–99)
GLUCOSE UR QL: NEGATIVE MG/DL — SIGNIFICANT CHANGE UP
HCT VFR BLD CALC: 42.4 % — SIGNIFICANT CHANGE UP (ref 42–52)
HGB BLD-MCNC: 13.2 G/DL — LOW (ref 14–18)
IMM GRANULOCYTES NFR BLD AUTO: 0 % — LOW (ref 0.1–0.3)
KETONES UR-MCNC: NEGATIVE MG/DL — SIGNIFICANT CHANGE UP
LACTATE SERPL-SCNC: 1.2 MMOL/L — SIGNIFICANT CHANGE UP (ref 0.7–2)
LEUKOCYTE ESTERASE UR-ACNC: NEGATIVE — SIGNIFICANT CHANGE UP
LYMPHOCYTES # BLD AUTO: 2.04 K/UL — SIGNIFICANT CHANGE UP (ref 1.2–3.4)
LYMPHOCYTES # BLD AUTO: 40.3 % — SIGNIFICANT CHANGE UP (ref 20.5–51.1)
MAGNESIUM SERPL-MCNC: 2.1 MG/DL — SIGNIFICANT CHANGE UP (ref 1.8–2.4)
MCHC RBC-ENTMCNC: 24.6 PG — LOW (ref 27–31)
MCHC RBC-ENTMCNC: 31.1 G/DL — LOW (ref 32–37)
MCV RBC AUTO: 79.1 FL — LOW (ref 80–94)
MONOCYTES # BLD AUTO: 0.52 K/UL — SIGNIFICANT CHANGE UP (ref 0.1–0.6)
MONOCYTES NFR BLD AUTO: 10.3 % — HIGH (ref 1.7–9.3)
NEUTROPHILS # BLD AUTO: 2.14 K/UL — SIGNIFICANT CHANGE UP (ref 1.4–6.5)
NEUTROPHILS NFR BLD AUTO: 42.3 % — SIGNIFICANT CHANGE UP (ref 42.2–75.2)
NITRITE UR-MCNC: NEGATIVE — SIGNIFICANT CHANGE UP
NRBC # BLD: 0 /100 WBCS — SIGNIFICANT CHANGE UP (ref 0–0)
PH UR: 7 — SIGNIFICANT CHANGE UP (ref 5–8)
PHOSPHATE SERPL-MCNC: 3 MG/DL — SIGNIFICANT CHANGE UP (ref 2.1–4.9)
PLATELET # BLD AUTO: 257 K/UL — SIGNIFICANT CHANGE UP (ref 130–400)
PMV BLD: 11.9 FL — HIGH (ref 7.4–10.4)
POTASSIUM SERPL-MCNC: 5.2 MMOL/L — HIGH (ref 3.5–5)
POTASSIUM SERPL-SCNC: 5.2 MMOL/L — HIGH (ref 3.5–5)
PROT SERPL-MCNC: 6.8 G/DL — SIGNIFICANT CHANGE UP (ref 6–8)
PROT UR-MCNC: NEGATIVE MG/DL — SIGNIFICANT CHANGE UP
RBC # BLD: 5.36 M/UL — SIGNIFICANT CHANGE UP (ref 4.7–6.1)
RBC # FLD: 15.8 % — HIGH (ref 11.5–14.5)
SODIUM SERPL-SCNC: 139 MMOL/L — SIGNIFICANT CHANGE UP (ref 135–146)
SP GR SPEC: 1.01 — SIGNIFICANT CHANGE UP (ref 1–1.03)
TROPONIN T SERPL-MCNC: <0.01 NG/ML — SIGNIFICANT CHANGE UP
UROBILINOGEN FLD QL: 0.2 MG/DL — SIGNIFICANT CHANGE UP (ref 0.2–1)
WBC # BLD: 5.06 K/UL — SIGNIFICANT CHANGE UP (ref 4.8–10.8)
WBC # FLD AUTO: 5.06 K/UL — SIGNIFICANT CHANGE UP (ref 4.8–10.8)

## 2023-08-10 PROCEDURE — 36415 COLL VENOUS BLD VENIPUNCTURE: CPT

## 2023-08-10 PROCEDURE — 71045 X-RAY EXAM CHEST 1 VIEW: CPT

## 2023-08-10 PROCEDURE — 80053 COMPREHEN METABOLIC PANEL: CPT

## 2023-08-10 PROCEDURE — 99285 EMERGENCY DEPT VISIT HI MDM: CPT | Mod: 25

## 2023-08-10 PROCEDURE — 93010 ELECTROCARDIOGRAM REPORT: CPT

## 2023-08-10 PROCEDURE — 72125 CT NECK SPINE W/O DYE: CPT | Mod: MA

## 2023-08-10 PROCEDURE — 80307 DRUG TEST PRSMV CHEM ANLYZR: CPT

## 2023-08-10 PROCEDURE — 83735 ASSAY OF MAGNESIUM: CPT

## 2023-08-10 PROCEDURE — 70450 CT HEAD/BRAIN W/O DYE: CPT | Mod: 26,MA

## 2023-08-10 PROCEDURE — 70450 CT HEAD/BRAIN W/O DYE: CPT | Mod: MA

## 2023-08-10 PROCEDURE — 93005 ELECTROCARDIOGRAM TRACING: CPT

## 2023-08-10 PROCEDURE — 72125 CT NECK SPINE W/O DYE: CPT | Mod: 26,MA

## 2023-08-10 PROCEDURE — 81003 URINALYSIS AUTO W/O SCOPE: CPT

## 2023-08-10 PROCEDURE — 71045 X-RAY EXAM CHEST 1 VIEW: CPT | Mod: 26

## 2023-08-10 PROCEDURE — 99284 EMERGENCY DEPT VISIT MOD MDM: CPT

## 2023-08-10 PROCEDURE — 84484 ASSAY OF TROPONIN QUANT: CPT

## 2023-08-10 PROCEDURE — 84100 ASSAY OF PHOSPHORUS: CPT

## 2023-08-10 PROCEDURE — 83605 ASSAY OF LACTIC ACID: CPT

## 2023-08-10 PROCEDURE — 85025 COMPLETE CBC W/AUTO DIFF WBC: CPT

## 2023-08-10 RX ORDER — SODIUM CHLORIDE 9 MG/ML
1000 INJECTION, SOLUTION INTRAVENOUS ONCE
Refills: 0 | Status: COMPLETED | OUTPATIENT
Start: 2023-08-10 | End: 2023-08-10

## 2023-08-10 RX ADMIN — SODIUM CHLORIDE 1000 MILLILITER(S): 9 INJECTION, SOLUTION INTRAVENOUS at 19:00

## 2023-08-10 RX ADMIN — SODIUM CHLORIDE 1000 MILLILITER(S): 9 INJECTION, SOLUTION INTRAVENOUS at 18:58

## 2023-08-10 NOTE — ED ADULT NURSE NOTE - CHIEF COMPLAINT QUOTE
s/p fall at home 2 weeks ago, patient went to [facility] for dizziness, headache and neck pain since the fall, denies anticoagulation, in triage patient agrressive towards self, called interpretor #[id number] Anita speaking, says hard of hearing, patient became combative in triage hitting and biting himself

## 2023-08-10 NOTE — ED ADULT NURSE NOTE - OBJECTIVE STATEMENT
83 year old male, presenting to ED c/o med eval. As per EMS, pt s/p fall x2 weeks ago, c/o dizziness, headache, and neck pain. Denies (-)LOC/AC use, n/v/d/fevers/chills. Pt confused baseline (h/o dementia). Pt noncompliant, aggressive towards self by slapping head, called  #[id number] Anita speaking. Pt refusing to speak to  or staff properly.     Pt placed on Tele/O2 monitor. Fall precautions implemented, BA in place, red socks utilized.

## 2023-08-10 NOTE — ED PROVIDER NOTE - PHYSICAL EXAMINATION
VITAL SIGNS: I have reviewed nursing notes and confirm.  CONSTITUTIONAL: Non-toxic, in NAD  SKIN: Warm dry, normal skin turgor  HEAD: NCAT  EYES: No conjunctival injection, scleral anicteric  ENT: Moist mucous membranes, normal pharynx with no erythema or exudates  NECK: Supple; full ROM. Nontender. No cervical LAD  CARD: RRR, no murmurs, rubs or gallops  RESP: Clear to ausculation bilaterally.  No rales, rhonchi, or wheezing.  ABD: Soft, non-distended, non-tender, no rebound or guarding. No CVA tenderness  EXT: Full ROM, no bony tenderness, no pedal edema, no calf tenderness  NEURO: GCS: 14; no focal neurological deficits.   PSYCH: agitated; confused

## 2023-08-10 NOTE — ED PROVIDER NOTE - OBJECTIVE STATEMENT
Patient is an 83 year old male with PMH of HTN and Dementia presenting for a headache. Per triage note, patient had a fall at home 2 weeks ago and has been experiencing episdes of dizziness, non-specific headaches and neck pain since the fall; denies anticoagulation.    In triage patient was aggressive towards self, called interpretor #[id number] Anita speaking, says hard of hearing, patient became combative in triage hitting and biting himself. Unknown if patient has a prior psych history.

## 2023-08-10 NOTE — ED ADULT TRIAGE NOTE - CHIEF COMPLAINT QUOTE
s/p fall at home 2 weeks ago, [facility], headache and neck pain since the fall, denies anticoagulation s/p fall at home 2 weeks ago, [facility], headache and neck pain since the fall, denies anticoagulation, in triage patient agrressive towards self, called interpretor #[id number] Anita speaking, says hard of hearing s/p fall at home 2 weeks ago, patient went to [facility] for dizziness, headache and neck pain since the fall, denies anticoagulation, in triage patient agrressive towards self, called interpretor #[id number] Anita speaking, says hard of hearing, patient became combative in triage hitting and biting himself

## 2023-08-10 NOTE — ED PROVIDER NOTE - CARE PROVIDER_API CALL
OMAR OSMAN, MAY OLVERA  Phone: [phone]  Fax: [phone]  Established Patient  Follow Up Time: 4-6 Days

## 2023-08-10 NOTE — ED PROVIDER NOTE - ATTENDING CONTRIBUTION TO CARE
86 year old male with a history of HTN, dementia presenting for headache. Pt fell ~2 wks ago and went to  today for eval; per family, patient was told to go to ER for imaging. Upon ED arrival, patient was combative and hitting and hiting and biting himself. pt in NAD head NC/AT, CN II-XII intact, PEERL, EOMi, neck (-) midline tenderness, lungs CTA B/L, CV S1S2 regular, abdomen soft/NT/ND/(+)BS, ext (-) edema, motor 5/5x4, sensation intact, ambulating with steady gait. 86 year old male with a history of HTN, dementia presenting for headache. Pt fell ~2 wks ago and went to  today for eval; per family, patient was told to go to ER for imaging. Upon ED arrival, patient was combative and hitting and biting himself. pt in NAD head NC/AT, CN II-XII intact, PEERL, EOMi, neck (-) midline tenderness, lungs CTA B/L, CV S1S2 regular, abdomen soft/NT/ND/(+)BS, ext (-) edema, motor 5/5x4, sensation intact, ambulating with steady gait.

## 2023-08-10 NOTE — ED ADULT NURSE NOTE - NSFALLHARMRISKINTERV_ED_ALL_ED

## 2023-08-10 NOTE — ED PROVIDER NOTE - PROGRESS NOTE DETAILS
[place]: Pt Granddaughter at bedside.  MD Joseph discussed negative CT results, blood pressure has come down, and granddaughter endorsed that patient is now at his baseline mental status.  Granddaughter is comfortable taking patient home and we are scheduling a follow-up appointment with his PCP doctor and neurology.

## 2023-08-10 NOTE — ED PROVIDER NOTE - PATIENT PORTAL LINK FT
You can access the FollowMyHealth Patient Portal offered by [facility] by registering at the following website: [email or web address]. By joining [facility]’s FollowMyHealth portal, you will also be able to view your health information using other applications (apps) compatible with our system.

## 2023-08-10 NOTE — ED PROVIDER NOTE - CLINICAL SUMMARY MEDICAL DECISION MAKING FREE TEXT BOX
86 year old male with a history of HTN, dementia presenting for headache. Pt fell ~2 wks ago and went to  today for eval; per family, patient was told to go to ER for imaging. Upon ED arrival, patient was combative and hitting and hiting and biting himself. pt in NAD head NC/AT, CN II-XII intact, PEERL, EOMi, neck (-) midline tenderness, lungs CTA B/L, CV S1S2 regular, abdomen soft/NT/ND/(+)BS, ext (-) edema, motor 5/5x4, sensation intact, ambulating with steady gait. Checked labs, UA to r/o UTI. CT head negative.  Spoke to granddaughter when she arrived in ED who stated that patient is at baseline and often becomes combative due to his dementia when he is in unfamiliar settings. States she feels comfortable taking him home and will follow up outpatient with PMD and neurologist. Patient to be discharged from ED. Any available test results were discussed with patient and/or family. Verbal instructions given, including instructions to return to ED immediately for any new, worsening, or concerning symptoms. Patient's family endorsed understanding. Written discharge instructions additionally given, including follow-up plan. 86 year old male with a history of HTN, dementia presenting for headache. Pt fell ~2 wks ago and went to  today for eval; per family, patient was told to go to ER for imaging. Upon ED arrival, patient was combative and hitting  and biting himself. pt in NAD head NC/AT, CN II-XII intact, PEERL, EOMi, neck (-) midline tenderness, lungs CTA B/L, CV S1S2 regular, abdomen soft/NT/ND/(+)BS, ext (-) edema, motor 5/5x4, sensation intact, ambulating with steady gait. Checked labs, UA to r/o UTI. CT head negative.  Spoke to granddaughter when she arrived in ED who stated that patient is at baseline and often becomes combative due to his dementia when he is in unfamiliar settings. States she feels comfortable taking him home and will follow up outpatient with PMD and neurologist. Patient to be discharged from ED. Any available test results were discussed with patient and/or family. Verbal instructions given, including instructions to return to ED immediately for any new, worsening, or concerning symptoms. Patient's family endorsed understanding. Written discharge instructions additionally given, including follow-up plan.

## 2023-08-10 NOTE — ED PROVIDER NOTE - NSFOLLOWUPINSTRUCTIONS_ED_ALL_ED_FT
Our Emergency Department Referral Coordinators will be reaching out to you in the next 24-48 hours from 9:00am to 5:00pm with a follow up appointment. Please expect a phone call from the hospital in that time frame. If you do not receive a call or if you have any questions or concerns, you can reach them at   [phone]        WHAT YOU NEED TO KNOW:    What is fall prevention? Fall prevention includes ways to make your home and other areas safer. Prevention also includes ways you can move more carefully to prevent a fall.    What increases my risk for falls? As you age, your muscles weaken and your risk for falls increases. Your risk also increases if you take medicines that make you sleepy or dizzy. You may also be at risk if you have vision or joint problems, have low blood pressure, or are not active.    How can I help protect myself from falls? Falls are a common cause of injury for older adults. Do the following to help protect yourself:    Stay active. Exercise can help strengthen your muscles and improve your balance. Your healthcare provider may recommend water aerobics, walking, or [employer]. He or she may also recommend physical therapy to improve your coordination. Never start an exercise program without asking your healthcare provider first.  Water Aerobics for Seniors  [employer] for Seniors      Wear shoes that fit well and have soles that . Wear shoes both inside and outside. Use slippers with good . Avoid shoes with high heels.    Use assistive devices as directed. Your healthcare provider may suggest that you use a cane or walker to help you keep your balance. You may need to have grab bars put in your bathroom near the toilet or in the shower.    Stand or sit up slowly. This may help you keep your balance and prevent falls.    Manage your medical conditions. Keep all appointments with your healthcare providers. Visit your eye doctor as directed.  How can I make my home safer?  Fall Prevention for Seniors    Add items to prevent falls in the bathroom. Put nonslip strips on your bath or shower floor to prevent you from slipping. Use a bath mat if you do not have carpet in the bathroom. This will prevent you from falling when you step out of the bath or shower. Use a shower seat so you do not need to stand while you shower. Sit on the toilet or a chair in your bathroom to dry yourself and put on clothing. This will prevent you from losing your balance from drying or dressing yourself while you are standing.    Keep paths clear. Remove books, shoes, and other objects from walkways and stairs. Place cords for telephones and lamps out of the way so that you do not need to walk over them. Tape them down if you cannot move them. Remove small rugs. If you cannot remove a rug, secure it with double-sided tape. This will prevent you from tripping.    Install bright lights in your home. Use night lights to help light paths to the bathroom or kitchen. Always turn on the light before you start walking.    Keep items you use often on shelves within reach. Do not use a step stool to help you reach an item.    Paint or place reflective tape on the edges of your stairs. This will help you see the stairs better.  How do I plan ahead in case I do fall? Talk with family members, friends, and neighbors to create a fall plan. Someone will need to call for emergency help if you are injured or found unconscious. If possible, keep a mobile phone with you at all times, or wear an emergency alert device. You can contact emergency services by pressing a button on the device. Ask your healthcare provider for more information.    Arrange to have someone call your local emergency number (911 in the [place]) if:    You have fallen and are found unconscious.    You have fallen and cannot move part of your body.  When should I call my doctor?    You have fallen and have pain or a headache.    You have questions or concerns about your condition or care.  CARE AGREEMENT:    You have the right to help plan your care. Learn about your health condition and how it may be treated. Discuss treatment options with your healthcare providers to decide what care you want to receive. You always have the right to refuse treatment.

## 2023-08-13 NOTE — ED PROVIDER NOTE - MEDICAL DECISION MAKING DETAILS
Patient and family offered admission for Neurology workup and PT/Physiatry evaluation, but want to go home.
No

## 2024-02-25 ENCOUNTER — EMERGENCY (EMERGENCY)
Facility: HOSPITAL | Age: 84
LOS: 0 days | Discharge: HOME | End: 2024-02-25
Attending: STUDENT IN AN ORGANIZED HEALTH CARE EDUCATION/TRAINING PROGRAM
Payer: SELF-PAY

## 2024-02-25 VITALS
RESPIRATION RATE: 20 BRPM | DIASTOLIC BLOOD PRESSURE: 90 MMHG | SYSTOLIC BLOOD PRESSURE: 170 MMHG | OXYGEN SATURATION: 99 % | HEART RATE: 85 BPM

## 2024-02-25 VITALS
HEART RATE: 76 BPM | OXYGEN SATURATION: 98 % | DIASTOLIC BLOOD PRESSURE: 75 MMHG | RESPIRATION RATE: 19 BRPM | TEMPERATURE: 98 F | SYSTOLIC BLOOD PRESSURE: 186 MMHG

## 2024-02-25 DIAGNOSIS — F03.90 UNSPECIFIED DEMENTIA WITHOUT BEHAVIORAL DISTURBANCE: ICD-10-CM

## 2024-02-25 DIAGNOSIS — Z96.652 PRESENCE OF LEFT ARTIFICIAL KNEE JOINT: Chronic | ICD-10-CM

## 2024-02-25 DIAGNOSIS — R60.0 LOCALIZED EDEMA: ICD-10-CM

## 2024-02-25 DIAGNOSIS — Z91.83 WANDERING IN DISEASES CLASSIFIED ELSEWHERE: ICD-10-CM

## 2024-02-25 DIAGNOSIS — Z86.718 PERSONAL HISTORY OF OTHER VENOUS THROMBOSIS AND EMBOLISM: ICD-10-CM

## 2024-02-25 DIAGNOSIS — Z79.01 LONG TERM (CURRENT) USE OF ANTICOAGULANTS: ICD-10-CM

## 2024-02-25 PROCEDURE — 99284 EMERGENCY DEPT VISIT MOD MDM: CPT

## 2024-02-25 PROCEDURE — 82962 GLUCOSE BLOOD TEST: CPT

## 2024-02-25 PROCEDURE — 99285 EMERGENCY DEPT VISIT HI MDM: CPT

## 2024-02-25 NOTE — ED PROVIDER NOTE - NSFOLLOWUPINSTRUCTIONS_ED_ALL_ED_FT
Please follow-up with PCP in 1 to 3 days. Return precautions explained in full to patient/family.    Dementia  Dementia is a condition that affects the way the brain functions. It often affects memory and thinking. Usually, dementia gets worse with time and cannot be reversed (progressive dementia). There are many types of dementia, including:  Alzheimer's disease. This type is the most common.  Vascular dementia. This type may happen as the result of a stroke.  Lewy body dementia. This type may happen to people who have Parkinson's disease.  Frontotemporal dementia. This type is caused by damage to nerve cells (neurons) in certain parts of the brain.  Some people may be affected by more than one type of dementia. This is called mixed dementia.    What are the causes?  Dementia is caused by damage to cells in the brain. The area of the brain and the types of cells damaged determine the type of dementia. Usually, this damage is irreversible or cannot be undone. Some examples of irreversible causes include:  Conditions that affect the blood vessels of the brain, such as diabetes, heart disease, or blood vessel disease.  Genetic mutations.  In some cases, changes in the brain may be caused by another condition and can be reversed or slowed. Some examples of reversible causes include:  Injury to the brain.  Certain medicines.  Infection, such as meningitis.  Metabolic problems, such as vitamin B12 deficiency or thyroid disease.  Pressure on the brain, such as from a tumor, blood clot, or too much fluid in the brain (hydrocephalus).  Autoimmune diseases that affect the brain or arteries, such as limbic encephalitis or vasculitis.  What are the signs or symptoms?  Symptoms of dementia depend on the type of dementia. Common signs of dementia include problems with remembering, thinking, problem solving, decision making, and communicating. These signs develop slowly or get worse with time. This may include:  Problems remembering events or people.  Having trouble taking a bath or putting clothes on.  Forgetting appointments or forgetting to pay bills.  Difficulty planning and preparing meals.  Having trouble speaking.  Getting lost easily.  Changes in behavior or mood.  How is this diagnosed?    This condition is diagnosed by a specialist (neurologist). It is diagnosed based on the history of your symptoms, your medical history, a physical exam, and tests. Tests may include:  Tests to evaluate brain function, such as memory tests, cognitive tests, and other tests.  Lab tests, such as blood or urine tests.  Imaging tests, such as a CT scan, a PET scan, or an MRI.  Genetic testing. This may be done if other family members have a diagnosis of certain types of dementia.  Your health care provider will talk with you and your family, friends, or caregivers about your history and symptoms.    How is this treated?  Treatment for this condition depends on the cause of the dementia. Progressive dementias, such as Alzheimer's disease, cannot be cured, but there may be treatments that help to manage symptoms.    Treatment might involve taking medicines that may help to:  Control the dementia.  Slow down the progression of the dementia.  Manage symptoms.  In some cases, treating the cause of your dementia can improve symptoms, reverse symptoms, or slow down how quickly your dementia becomes worse.    Your health care provider can direct you to support groups, organizations, and other health care providers who can help with decisions about your care.    Follow these instructions at home:  Medicines    Take over-the-counter and prescription medicines only as told by your health care provider.  Use a pill organizer or pill reminder to help you manage your medicines.  Avoid taking medicines that can affect thinking, such as pain medicines or sleeping medicines.  Lifestyle    Make healthy lifestyle choices.  Be physically active as told by your health care provider.  Do not use any products that contain nicotine or tobacco, such as cigarettes, e-cigarettes, and chewing tobacco. If you need help quitting, ask your health care provider.  Do not drink alcohol.  Practice stress-management techniques when you get stressed.  Spend time with other people.  Make sure to get quality sleep. These tips can help you get a good night's rest:  Avoid napping during the day.  Keep your sleeping area dark and cool.  Avoid exercising during the few hours before you go to bed.  Avoid caffeine products in the evening.  Eating and drinking    Drink enough fluid to keep your urine pale yellow.  Eat a healthy diet.  General instructions      Work with your health care provider to determine what you need help with and what your safety needs are.  Talk with your health care provider about whether it is safe for you to drive.  If you were given a bracelet that identifies you as a person with memory loss or tracks your location, make sure to wear it at all times.  Work with your family to make important decisions, such as advance directives, medical power of , or a living will.  Keep all follow-up visits. This is important.  Where to find more information  Alzheimer's Association: www.alz.org  National [employer] on Aging: www.shelli.nih.gov/alzheimers  World Health Organization: www.who.int  Contact a health care provider if:  You have any new or worsening symptoms.  You have problems with choking or swallowing.  Get help right away if:  You feel depressed or sad, or feel that you want to harm yourself.  Your family members become concerned for your safety.  If you ever feel like you may hurt yourself or others, or have thoughts about taking your own life, get help right away. Go to your nearest emergency department or:  Call your local emergency services (884 in the [place]  Call a suicide crisis helpline, such as the National Suicide Prevention Lifeline at 1-608.604.7649 or 069 in the [place] This is open 24 hours a day in the [place]  Text the Crisis Text Line at 226454 (in the [place]  Summary  Dementia is a condition that affects the way the brain functions. Dementia often affects memory and thinking.  Usually, dementia gets worse with time and cannot be reversed (progressive dementia).  Treatment for this condition depends on the cause of the dementia.  Work with your health care provider to determine what you need help with and what your safety needs are.  Your health care provider can direct you to support groups, organizations, and other health care providers who can help with decisions about your care.  This information is not intended to replace advice given to you by your health care provider. Make sure you discuss any questions you have with your health care provider.    Document Revised: 07/13/2022 Document Reviewed: 05/03/2021  Elsevier Patient Education © 2023 [employer]

## 2024-02-25 NOTE — ED ADULT TRIAGE NOTE - CHIEF COMPLAINT QUOTE
BIB [employer] for altered mental status. Pt found wandering in street with shorts and sandals BIB [employer] and EMS for being found wandering in shorts and sandals pt was speaking in a language that was unable to find out , found out language was Gujarati pt unable to staet his full name and birthdate , states via  "I don't know nothing" pt denies any complaints

## 2024-02-25 NOTE — ED PROVIDER NOTE - PHYSICAL EXAMINATION
CONSTITUTIONAL: NAD  SKIN: Warm dry  HEAD: NCAT  EYES: NL inspection  ENT: MMM  CARD: RRR  RESP: CTAB  ABD: Soft, non tender, no rebound or guarding   EXT: no pedal edema  NEURO: Gunable to assess

## 2024-02-25 NOTE — ED ADULT NURSE REASSESSMENT NOTE - NS ED NURSE REASSESS COMMENT FT1
Family arrived to hospital and able to identify patient. Family states this is the thrid time he has wandered off. Family also reports pt is on Eliquis for DVT.

## 2024-02-25 NOTE — ED PROVIDER NOTE - PATIENT PORTAL LINK FT
You can access the FollowMyHealth Patient Portal offered by [facility] by registering at the following website: [email or web address]. By joining [employer]’s FollowMyHealth portal, you will also be able to view your health information using other applications (apps) compatible with our system.

## 2024-02-25 NOTE — ED PROVIDER NOTE - PROGRESS NOTE DETAILS
SG: Patient assessed at bedside. Patient's language was eventually determined to be Gujarati. Utilizing , patient was still unable to give his name or birthday, keeps denying any complaints and reporting that he does not know anything. SG: Patient assessed at bedside.  Was eventually able to be determined, family called, arrived at bedside.  Reports that the patient is at baseline, noted that he has a history of dementia and often will walk out into the streets.  Family is amenable to discharge.

## 2024-02-25 NOTE — ED ADULT NURSE NOTE - CHIEF COMPLAINT QUOTE
BIB [employer] and [facility] for being found wandering in shorts and sandals pt was speaking in a language that was unable to find out , found out language was Gujarati pt unable to staet his full name and birthdate , states via  "I don't know nothing" pt denies any complaints

## 2024-02-25 NOTE — ED PROVIDER NOTE - CLINICAL SUMMARY MEDICAL DECISION MAKING FREE TEXT BOX
84-year-old male with history of dementia brought in by police after he was found wandering in the streets.  Patient appears well-groomed in no acute distress.  No signs of trauma noted.  Noted to have left lower extremity edema however patient speaks Gujarati with  we were able to identify patient's home and family came to pick patient up who endorses hx of wandering. 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  Patient stable for discharge at this time.

## 2024-02-25 NOTE — ED PROVIDER NOTE - OBJECTIVE STATEMENT
84-year-old male of unknown medical history brought in by [facility] and EMS for being found wandering [place] streets.  Patient is in shorts and sandals. Pt was speaking in a Unknown language, appears well otherwise.

## 2024-05-01 NOTE — PROGRESS NOTE ADULT - SUBJECTIVE AND OBJECTIVE BOX
Problem: Safety - Adult  Goal: Free from fall injury  4/23/2024 1025 by Tej Rae RN  Outcome: Progressing  4/23/2024 0035 by iRcky Vanegas RN  Outcome: Progressing     Problem: ABCDS Injury Assessment  Goal: Absence of physical injury  4/23/2024 1025 by Tej Rae RN  Outcome: Progressing  4/23/2024 0035 by Ricky Vanegas RN  Outcome: Progressing     Problem: Pain  Goal: Verbalizes/displays adequate comfort level or baseline comfort level  4/23/2024 1025 by Tej Rae RN  Outcome: Progressing  4/23/2024 0035 by Ricky Vanegas RN  Outcome: Progressing  Flowsheets (Taken 4/22/2024 1935 by Kamille Snadhu, RN)  Verbalizes/displays adequate comfort level or baseline comfort level:   Encourage patient to monitor pain and request assistance   Assess pain using appropriate pain scale   Administer analgesics based on type and severity of pain and evaluate response     Problem: Discharge Planning  Goal: Discharge to home or other facility with appropriate resources  4/23/2024 1025 by Tej aRe RN  Outcome: Progressing  4/23/2024 0035 by Ricky Vanegas RN  Outcome: Progressing     Problem: Skin/Tissue Integrity  Goal: Absence of new skin breakdown  Description: 1.  Monitor for areas of redness and/or skin breakdown  2.  Assess vascular access sites hourly  3.  Every 4-6 hours minimum:  Change oxygen saturation probe site  4.  Every 4-6 hours:  If on nasal continuous positive airway pressure, respiratory therapy assess nares and determine need for appliance change or resting period.  4/23/2024 1025 by Tej Rae RN  Outcome: Progressing  4/23/2024 0035 by Ricky Vanegas RN  Outcome: Progressing        Problem: Safety - Adult  Goal: Free from fall injury  4/23/2024 2239 by Breanna Huffman LPN  Outcome: Progressing  4/23/2024 1025 by Tej Rae, RN  Outcome: Progressing     Problem: Pain  Goal: Verbalizes/displays adequate comfort level or baseline comfort level  4/23/2024 2239 by Breanna Huffman LPN  Outcome: Progressing  4/23/2024 1025 by Tej Rae, RN  Outcome: Progressing        Problem: Safety - Adult  Goal: Free from fall injury  4/24/2024 2330 by Beronica Flores RN  Outcome: Progressing  4/24/2024 0958 by Kari Gaspar RN  Outcome: Progressing     Problem: ABCDS Injury Assessment  Goal: Absence of physical injury  4/24/2024 2330 by Beronica Flores RN  Outcome: Progressing  4/24/2024 0958 by Kari Gaspar RN  Outcome: Progressing     Problem: Pain  Goal: Verbalizes/displays adequate comfort level or baseline comfort level  4/24/2024 2330 by Beronica Flores RN  Outcome: Progressing  4/24/2024 0958 by Kari Gaspar RN  Outcome: Progressing     Problem: Discharge Planning  Goal: Discharge to home or other facility with appropriate resources  4/24/2024 2330 by Beronica Flores RN  Outcome: Progressing  4/24/2024 0958 by Kari Gaspar RN  Outcome: Progressing  Flowsheets (Taken 4/23/2024 2000 by Breanna Huffman LPN)  Discharge to home or other facility with appropriate resources: Identify barriers to discharge with patient and caregiver     Problem: Skin/Tissue Integrity  Goal: Absence of new skin breakdown  Description: 1.  Monitor for areas of redness and/or skin breakdown  2.  Assess vascular access sites hourly  3.  Every 4-6 hours minimum:  Change oxygen saturation probe site  4.  Every 4-6 hours:  If on nasal continuous positive airway pressure, respiratory therapy assess nares and determine need for appliance change or resting period.  4/24/2024 2330 by Beronica Flores RN  Outcome: Progressing  4/24/2024 0958 by Kari Gaspar RN  Outcome: Progressing        Problem: Safety - Adult  Goal: Free from fall injury  4/26/2024 0237 by Lesa Mckeon RN  Outcome: Progressing  4/25/2024 1258 by Kari Gaspar RN  Outcome: Progressing     Problem: ABCDS Injury Assessment  Goal: Absence of physical injury  4/26/2024 0237 by Lesa Mckeon RN  Outcome: Progressing  4/25/2024 1258 by Kari Gaspar RN  Outcome: Progressing     Problem: Pain  Goal: Verbalizes/displays adequate comfort level or baseline comfort level  4/26/2024 0237 by Lesa Mckeon RN  Outcome: Progressing  Flowsheets (Taken 4/25/2024 2042)  Verbalizes/displays adequate comfort level or baseline comfort level:   Encourage patient to monitor pain and request assistance   Assess pain using appropriate pain scale   Administer analgesics based on type and severity of pain and evaluate response   Implement non-pharmacological measures as appropriate and evaluate response   Consider cultural and social influences on pain and pain management   Notify Licensed Independent Practitioner if interventions unsuccessful or patient reports new pain  4/25/2024 1258 by Kari Gaspar RN  Outcome: Progressing     Problem: Discharge Planning  Goal: Discharge to home or other facility with appropriate resources  4/26/2024 0237 by Lesa Mckeon RN  Outcome: Progressing  Flowsheets (Taken 4/25/2024 2042)  Discharge to home or other facility with appropriate resources:   Identify barriers to discharge with patient and caregiver   Arrange for needed discharge resources and transportation as appropriate  4/25/2024 1258 by Kari Gaspar RN  Outcome: Progressing     Problem: Skin/Tissue Integrity  Goal: Absence of new skin breakdown  Description: 1.  Monitor for areas of redness and/or skin breakdown  2.  Assess vascular access sites hourly  3.  Every 4-6 hours minimum:  Change oxygen saturation probe site  4.  Every 4-6 hours:  If on nasal continuous positive airway pressure, respiratory therapy assess nares and determine need for appliance    Problem: Safety - Adult  Goal: Free from fall injury  4/26/2024 1021 by Inessa Barkley RN  Outcome: Progressing  4/26/2024 0237 by Lesa Mckeon RN  Outcome: Progressing     Problem: ABCDS Injury Assessment  Goal: Absence of physical injury  4/26/2024 0237 by Lesa Mckeon RN  Outcome: Progressing     Problem: Pain  Goal: Verbalizes/displays adequate comfort level or baseline comfort level  4/26/2024 0237 by Lesa Mckeon, RN  Outcome: Progressing  Flowsheets (Taken 4/25/2024 2042)  Verbalizes/displays adequate comfort level or baseline comfort level:   Encourage patient to monitor pain and request assistance   Assess pain using appropriate pain scale   Administer analgesics based on type and severity of pain and evaluate response   Implement non-pharmacological measures as appropriate and evaluate response   Consider cultural and social influences on pain and pain management   Notify Licensed Independent Practitioner if interventions unsuccessful or patient reports new pain     Problem: Discharge Planning  Goal: Discharge to home or other facility with appropriate resources  4/26/2024 0237 by Lesa Mckeon, RN  Outcome: Progressing  Flowsheets (Taken 4/25/2024 2042)  Discharge to home or other facility with appropriate resources:   Identify barriers to discharge with patient and caregiver   Arrange for needed discharge resources and transportation as appropriate     Problem: Skin/Tissue Integrity  Goal: Absence of new skin breakdown  Description: 1.  Monitor for areas of redness and/or skin breakdown  2.  Assess vascular access sites hourly  3.  Every 4-6 hours minimum:  Change oxygen saturation probe site  4.  Every 4-6 hours:  If on nasal continuous positive airway pressure, respiratory therapy assess nares and determine need for appliance change or resting period.  4/26/2024 0237 by Lesa Mckeon, RN  Outcome: Progressing        Problem: Safety - Adult  Goal: Free from fall injury  4/27/2024 0350 by Margaret Miramontes, RN  Outcome: Progressing  4/27/2024 0350 by Margaret Miramontes, RN  Outcome: Progressing     Problem: Pain  Goal: Verbalizes/displays adequate comfort level or baseline comfort level  Outcome: Progressing     Problem: Discharge Planning  Goal: Discharge to home or other facility with appropriate resources  Outcome: Progressing  Flowsheets (Taken 4/26/2024 2030)  Discharge to home or other facility with appropriate resources: Identify barriers to discharge with patient and caregiver     Problem: Skin/Tissue Integrity  Goal: Absence of new skin breakdown  Description: 1.  Monitor for areas of redness and/or skin breakdown  2.  Assess vascular access sites hourly  3.  Every 4-6 hours minimum:  Change oxygen saturation probe site  4.  Every 4-6 hours:  If on nasal continuous positive airway pressure, respiratory therapy assess nares and determine need for appliance change or resting period.  Outcome: Progressing        Problem: Safety - Adult  Goal: Free from fall injury  4/27/2024 1152 by Krai Garcia RN  Outcome: Progressing  4/27/2024 0350 by Margaret Miramontes RN  Outcome: Progressing  4/27/2024 0350 by Margaret Miramontes RN  Outcome: Progressing     Problem: ABCDS Injury Assessment  Goal: Absence of physical injury  Outcome: Progressing     Problem: Pain  Goal: Verbalizes/displays adequate comfort level or baseline comfort level  4/27/2024 1152 by Kari Garcia RN  Outcome: Progressing  4/27/2024 0350 by Margaret Miramontes RN  Outcome: Progressing     Problem: Discharge Planning  Goal: Discharge to home or other facility with appropriate resources  4/27/2024 1152 by Kari Garcia RN  Outcome: Progressing  Flowsheets (Taken 4/27/2024 0810)  Discharge to home or other facility with appropriate resources: Identify barriers to discharge with patient and caregiver  4/27/2024 0350 by Margaret Miramontes RN  Outcome: Progressing  Flowsheets (Taken 4/26/2024 2030)  Discharge to home or other facility with appropriate resources: Identify barriers to discharge with patient and caregiver     Problem: Skin/Tissue Integrity  Goal: Absence of new skin breakdown  Description: 1.  Monitor for areas of redness and/or skin breakdown  2.  Assess vascular access sites hourly  3.  Every 4-6 hours minimum:  Change oxygen saturation probe site  4.  Every 4-6 hours:  If on nasal continuous positive airway pressure, respiratory therapy assess nares and determine need for appliance change or resting period.  4/27/2024 1152 by Kari Garcia RN  Outcome: Progressing  4/27/2024 0350 by Margaret Miramontes RN  Outcome: Progressing     Problem: Occupational Therapy - Adult  Goal: By Discharge: Performs self-care activities at highest level of function for planned discharge setting.  See evaluation for individualized goals.  4/27/2024 1047 by Sendy Mancia, PETER  Outcome: Progressing  Note: FUNCTIONAL STATUS PRIOR TO ADMISSION:  Independent w/ BADLs, some assist for    Problem: Safety - Adult  Goal: Free from fall injury  4/28/2024 0136 by Margaret Miramontes RN  Outcome: Progressing  4/27/2024 1152 by Kari Garcia RN  Outcome: Progressing     Problem: ABCDS Injury Assessment  Goal: Absence of physical injury  4/27/2024 1152 by Kari Garcia RN  Outcome: Progressing     Problem: Pain  Goal: Verbalizes/displays adequate comfort level or baseline comfort level  4/27/2024 1152 by Kari Garcia RN  Outcome: Progressing     Problem: Discharge Planning  Goal: Discharge to home or other facility with appropriate resources  4/28/2024 0136 by Margaret Miramontes RN  Outcome: Progressing  4/27/2024 1152 by Kari Garcia RN  Outcome: Progressing  Flowsheets (Taken 4/27/2024 0810)  Discharge to home or other facility with appropriate resources: Identify barriers to discharge with patient and caregiver     Problem: Skin/Tissue Integrity  Goal: Absence of new skin breakdown  Description: 1.  Monitor for areas of redness and/or skin breakdown  2.  Assess vascular access sites hourly  3.  Every 4-6 hours minimum:  Change oxygen saturation probe site  4.  Every 4-6 hours:  If on nasal continuous positive airway pressure, respiratory therapy assess nares and determine need for appliance change or resting period.  4/28/2024 0136 by Margaret Miramontes RN  Outcome: Progressing  4/27/2024 1152 by Kari Garcia RN  Outcome: Progressing     Problem: Physical Therapy - Adult  Goal: By Discharge: Performs mobility at highest level of function for planned discharge setting.  See evaluation for individualized goals.  Description: FUNCTIONAL STATUS PRIOR TO ADMISSION: Patient was modified independent reports independent ambulation and use of rollator for functional mobility.    HOME SUPPORT PRIOR TO ADMISSION: The patient lived with dtr and son-in-law and their children but reports she did not require assistance.    Physical Therapy Goals  Initiated 4/27/2024  Pt stated goal: to move around without pain.  Pt will be I with    Problem: Safety - Adult  Goal: Free from fall injury  4/28/2024 1110 by Kari Garcia RN  Outcome: Progressing  4/28/2024 0136 by Margaret Miramontes RN  Outcome: Progressing     Problem: ABCDS Injury Assessment  Goal: Absence of physical injury  Outcome: Progressing     Problem: Pain  Goal: Verbalizes/displays adequate comfort level or baseline comfort level  Outcome: Progressing     Problem: Discharge Planning  Goal: Discharge to home or other facility with appropriate resources  4/28/2024 1110 by Kari Garcia RN  Outcome: Progressing  4/28/2024 0136 by Margaret Miramontes RN  Outcome: Progressing     Problem: Skin/Tissue Integrity  Goal: Absence of new skin breakdown  Description: 1.  Monitor for areas of redness and/or skin breakdown  2.  Assess vascular access sites hourly  3.  Every 4-6 hours minimum:  Change oxygen saturation probe site  4.  Every 4-6 hours:  If on nasal continuous positive airway pressure, respiratory therapy assess nares and determine need for appliance change or resting period.  4/28/2024 1110 by Kari Garcia RN  Outcome: Progressing  4/28/2024 0136 by Mragaret Miramontes RN  Outcome: Progressing        Problem: Safety - Adult  Goal: Free from fall injury  4/29/2024 0807 by Vera Pryor RN  Outcome: Progressing  4/29/2024 0128 by Breanna Huffman LPN  Outcome: Progressing     Problem: ABCDS Injury Assessment  Goal: Absence of physical injury  Outcome: Progressing     Problem: Pain  Goal: Verbalizes/displays adequate comfort level or baseline comfort level  4/29/2024 0807 by Vera Pryor RN  Outcome: Progressing  4/29/2024 0128 by Breanna Huffman LPN  Outcome: Progressing     Problem: Discharge Planning  Goal: Discharge to home or other facility with appropriate resources  Outcome: Progressing  Flowsheets (Taken 4/28/2024 1940 by Sobia Delgadillo, RN)  Discharge to home or other facility with appropriate resources: Identify barriers to discharge with patient and caregiver     Problem: Skin/Tissue Integrity  Goal: Absence of new skin breakdown  Description: 1.  Monitor for areas of redness and/or skin breakdown  2.  Assess vascular access sites hourly  3.  Every 4-6 hours minimum:  Change oxygen saturation probe site  4.  Every 4-6 hours:  If on nasal continuous positive airway pressure, respiratory therapy assess nares and determine need for appliance change or resting period.  Outcome: Progressing        Problem: Safety - Adult  Goal: Free from fall injury  4/30/2024 1105 by Sendy Joseph RN  Outcome: Progressing  4/30/2024 0313 by Kenneth Garner RN  Outcome: Progressing     Problem: ABCDS Injury Assessment  Goal: Absence of physical injury  4/30/2024 1105 by Sendy Joseph RN  Outcome: Progressing  4/30/2024 0313 by Kenneth Garner RN  Outcome: Progressing     Problem: Pain  Goal: Verbalizes/displays adequate comfort level or baseline comfort level  4/30/2024 1105 by Sendy Joseph RN  Outcome: Progressing  4/30/2024 0313 by Kenneth Garner RN  Outcome: Progressing     Problem: Discharge Planning  Goal: Discharge to home or other facility with appropriate resources  4/30/2024 1105 by Sendy Joseph RN  Outcome: Progressing  4/30/2024 0313 by Kenneth Garner RN  Outcome: Progressing     Problem: Skin/Tissue Integrity  Goal: Absence of new skin breakdown  Description: 1.  Monitor for areas of redness and/or skin breakdown  2.  Assess vascular access sites hourly  3.  Every 4-6 hours minimum:  Change oxygen saturation probe site  4.  Every 4-6 hours:  If on nasal continuous positive airway pressure, respiratory therapy assess nares and determine need for appliance change or resting period.  4/30/2024 1105 by Sendy Joseph RN  Outcome: Progressing  4/30/2024 0313 by Kenneth Garner RN  Outcome: Progressing        Problem: Safety - Adult  Goal: Free from fall injury  4/30/2024 2351 by Kenneth Garner RN  Outcome: Progressing  4/30/2024 1105 by Sendy Joseph RN  Outcome: Progressing     Problem: ABCDS Injury Assessment  Goal: Absence of physical injury  4/30/2024 2351 by Kenneth Garner RN  Outcome: Progressing  4/30/2024 1105 by Sendy Joseph RN  Outcome: Progressing     Problem: Pain  Goal: Verbalizes/displays adequate comfort level or baseline comfort level  4/30/2024 2351 by Kenneth Garner RN  Outcome: Progressing  4/30/2024 1105 by Sendy Joseph RN  Outcome: Progressing     Problem: Discharge Planning  Goal: Discharge to home or other facility with appropriate resources  4/30/2024 2351 by Kenneth Garner RN  Outcome: Progressing  4/30/2024 1105 by Sendy Joseph RN  Outcome: Progressing     Problem: Skin/Tissue Integrity  Goal: Absence of new skin breakdown  Description: 1.  Monitor for areas of redness and/or skin breakdown  2.  Assess vascular access sites hourly  3.  Every 4-6 hours minimum:  Change oxygen saturation probe site  4.  Every 4-6 hours:  If on nasal continuous positive airway pressure, respiratory therapy assess nares and determine need for appliance change or resting period.  4/30/2024 2351 by Kenneth Garner RN  Outcome: Progressing  4/30/2024 1105 by Sendy Joseph RN  Outcome: Progressing     Problem: Physical Therapy - Adult  Goal: By Discharge: Performs mobility at highest level of function for planned discharge setting.  See evaluation for individualized goals.  Description: FUNCTIONAL STATUS PRIOR TO ADMISSION: Patient was modified independent reports independent ambulation and use of rollator for functional mobility.    HOME SUPPORT PRIOR TO ADMISSION: The patient lived with dtr and son-in-law and their children but reports she did not require assistance.    Physical Therapy Goals  Initiated 4/27/2024  Pt stated goal: to move around    Problem: Safety - Adult  Goal: Free from fall injury  Outcome: Progressing     Problem: ABCDS Injury Assessment  Goal: Absence of physical injury  Outcome: Progressing     Problem: Pain  Goal: Verbalizes/displays adequate comfort level or baseline comfort level  Outcome: Progressing     Problem: Discharge Planning  Goal: Discharge to home or other facility with appropriate resources  Outcome: Progressing     Problem: Skin/Tissue Integrity  Goal: Absence of new skin breakdown  Description: 1.  Monitor for areas of redness and/or skin breakdown  2.  Assess vascular access sites hourly  3.  Every 4-6 hours minimum:  Change oxygen saturation probe site  4.  Every 4-6 hours:  If on nasal continuous positive airway pressure, respiratory therapy assess nares and determine need for appliance change or resting period.  Outcome: Progressing        Problem: Safety - Adult  Goal: Free from fall injury  Outcome: Progressing     Problem: ABCDS Injury Assessment  Goal: Absence of physical injury  Outcome: Progressing     Problem: Pain  Goal: Verbalizes/displays adequate comfort level or baseline comfort level  Outcome: Progressing  Flowsheets (Taken 4/22/2024 1935 by Kamille Sandhu RN)  Verbalizes/displays adequate comfort level or baseline comfort level:   Encourage patient to monitor pain and request assistance   Assess pain using appropriate pain scale   Administer analgesics based on type and severity of pain and evaluate response     Problem: Skin/Tissue Integrity  Goal: Absence of new skin breakdown  Description: 1.  Monitor for areas of redness and/or skin breakdown  2.  Assess vascular access sites hourly  3.  Every 4-6 hours minimum:  Change oxygen saturation probe site  4.  Every 4-6 hours:  If on nasal continuous positive airway pressure, respiratory therapy assess nares and determine need for appliance change or resting period.  Outcome: Progressing        Problem: Safety - Adult  Goal: Free from fall injury  Outcome: Progressing     Problem: Pain  Goal: Verbalizes/displays adequate comfort level or baseline comfort level  Outcome: Progressing      Progressing in care plan   progressing   Patient is a 78y old  Male who presents with a chief complaint of fall (17 Feb 2018 16:51)    Patient was seen and examined.  Denies headache, dizziness, nausea, Vomitting today.  Denies any other complaints.    PAST MEDICAL & SURGICAL HISTORY:  Dementia  HTN (hypertension)  S/P TKR (total knee replacement), left    No Known Allergies    MEDICATIONS  (STANDING):  aspirin enteric coated 81 milliGRAM(s) Oral daily  atorvastatin 10 milliGRAM(s) Oral at bedtime  docusate sodium 100 milliGRAM(s) Oral daily  heparin  Injectable 5000 Unit(s) SubCutaneous every 8 hours  losartan 50 milliGRAM(s) Oral daily  pantoprazole    Tablet 40 milliGRAM(s) Oral before breakfast  senna 1 Tablet(s) Oral three times a day    MEDICATIONS  (PRN):  ondansetron Injectable 4 milliGRAM(s) IV Push every 6 hours PRN Nausea and/or Vomiting    Vital Signs Last 24 Hrs  T(C): 36.7  T(F): 98  HR: 65  BP: 137/65  BP(mean): --  RR: 16  SpO2: --    O/E:  Awake, alert, not in distress.  HEENT: abrasions to the forehead.  EOMI.  Chest: clear.  CVS: SIS2 +, no murmur.  P/A: Soft, BS+  CNS: non focal.  Ext: no edema feet.  Skin: brasions to the forehead and  left hand, laceration to the left cheek and to the upper lip  All systems reviewed positive findings as above. independent ambulation and use of rollator for functional mobility.    HOME SUPPORT PRIOR TO ADMISSION: The patient lived with dtr and son-in-law and their children but reports she did not require assistance.    Physical Therapy Goals  Initiated 4/27/2024  Pt stated goal: to move around without pain.  Pt will be I with [facility] HEP in 7 days.  Pt will perform bed mobility with Modified [place] in 7 days.  Pt will perform transfers with Modified [place] in 7 days.   Pt will amb  feet with LRAD safely with Modified [place] in 7 days.  Pt will ascend/descend 5 steps with ayad handrail(s) and Contact Guard Assist in 7 days to safely enter/navigate home.   Pt will demonstrate improvement in dynamic standing/gait balance from needing constant UE support to independent in 7 days.    Outcome: Progressing          PLAN :  Patient continues to benefit from skilled intervention to address functional impairments. Continue treatment per established plan of care to address goals.    Recommendation for discharge: (in order for the patient to meet his/her long term goals)  Home with family assist 24/7 Supervision    IF patient discharges home will need the following DME:rolling walker     SUBJECTIVE:   Patient stated “I am trying to drink liquids now.\"    OBJECTIVE DATA SUMMARY:   Critical Behavior:  Orientation  Overall Orientation Status: Within Normal Limits  Orientation Level: Oriented X4  Cognition  Overall Cognitive Status: WNL    Functional Mobility Training:  Bed Mobility:  Bed Mobility Training  Bed Mobility Training: Yes  Interventions: Verbal cues;Visual cues;Demonstration (Instructed in logrolling)  Rolling: Modified independent  Supine to Sit: Stand-by assistance  Scooting: Stand-by assistance  Transfers:  Transfer Training  Transfer Training: Yes  Interventions: Verbal cues  Sit to Stand: Contact-guard assistance;Additional time  Stand to Sit: Contact-guard assistance;Additional time  Bed to Chair:  X4  Cognition  Overall Cognitive Status: WFL    Functional Mobility Training:  Bed Mobility:  Bed Mobility Training  Bed Mobility Training: Yes  Interventions: Verbal cues  Supine to Sit: Stand-by assistance  Scooting: Stand-by assistance  Transfers:  Transfer Training  Transfer Training: Yes  Overall Level of Assistance: Contact-guard assistance;Assist X1;Additional time  Interventions: Verbal cues;Tactile cues  Sit to Stand: Contact-guard assistance;Additional time;Assist X1  Stand to Sit: Contact-guard assistance;Additional time;Assist X1  Bed to Chair: Contact-guard assistance;Additional time;Assist X1  Toilet Transfer: Contact-guard assistance;Assist X1;Additional time  Balance:  Balance  Sitting: Intact  Standing: Impaired  Standing - Static: Good;Constant support  Standing - Dynamic: Good;Constant support  Wheelchair Mobility:  Wheelchair Management  Wheelchair Management: No  Ambulation/Gait Training:     Gait  Gait Training: Yes  Overall Level of Assistance: Contact-guard assistance;Additional time;Assist X1  Distance (ft): 10 Feet  Assistive Device: Gait belt;Walker, rolling  Interventions: Verbal cues;Manual cues  Base of Support: Widened  Speed/Angelica: Shuffled;Slow  Gait Abnormalities: Decreased step clearance;Trendelenburg   No LOB or buckling noted during gait training, patient slow to ambulate      Pain Ratin/10 abdomen reported    Pain Intervention(s):   nursing notified and addressing, rest, and repositioning    Activity Tolerance:   Fair , requires rest breaks, and observed shortness of breath on exertion    After treatment patient left in no apparent distress:   Bed locked and returned to lowest position, Patient left in no apparent distress sitting up in chair, Call bell within reach, and Heels elevated for pressure relief, and nsg updated     COMMUNICATION/COLLABORATION:   The patient’s plan of care was discussed with: Physical therapist and Registered nurse    Patient  assistance  Transfers:     Transfer Training  Transfer Training: Yes  Sit to Stand: Contact-guard assistance  Stand to Sit: Contact-guard assistance  Bed to Chair: Contact-guard assistance  Sliding Board: Contact-guard assistance  Balance:               Balance  Sitting: Intact  Standing: Impaired  Standing - Static: Constant support  Wheelchair Mobility:        Ambulation/Gait Training:                                Gait  Gait Training: Yes  Overall Level of Assistance: Contact-guard assistance  Distance (ft): 10 Feet  Assistive Device: Gait belt;Walker, rolling  Interventions: Verbal cues;Manual cues  Base of Support: Widened  Speed/Angelica: Shuffled;Slow  Gait Abnormalities: Decreased step clearance;Trendelenburg                   Therapeutic Exercises:   Pt educated on LE HEP to include ankle pumps, LAQ, hip add/abd, glute sets, instructed to complete throughout the day to improve ROM and strength with good understanding verbalized and demonstrated.     Functional Measure:  [facility]™ “6 Clicks”         Basic Mobility Inpatient Short Form  How much difficulty does the patient currently have... Unable A Lot A Little None   1.  Turning over in bed (including adjusting bedclothes, sheets and blankets)?   [] 1   [] 2   [x] 3   [] 4   2.  Sitting down on and standing up from a chair with arms ( e.g., wheelchair, bedside commode, etc.)   [] 1   [] 2   [x] 3   [] 4   3.  Moving from lying on back to sitting on the side of the bed?   [] 1   [] 2   [x] 3   [] 4          How much help from another person does the patient currently need... Total A Lot A Little None   4.  Moving to and from a bed to a chair (including a wheelchair)?   [] 1   [] 2   [x] 3   [] 4   5.  Need to walk in hospital room?   [] 1   [] 2   [x] 3   [] 4   6.  Climbing 3-5 steps with a railing?   [] 1   [x] 2   [] 3   [] 4   © 2007, Trustees of [facility], under license to [employer]. All rights reserved     Score:  Initial:  Provided: Role of Therapy;Plan of Care;ADL Adaptive Strategies;Energy Conservation;Fall Prevention Strategies  Education Method: Verbal  Barriers to Learning: None  Education Outcome: Verbalized understanding    The supervising occupational therapist and treating occupational therapist assistant have met to review this patient’s progress and plan of care.    This patient’s plan of care is appropriate for delegation to CRISTIANE.     Thank you for this referral,  Sendy Mancia, OT  Minutes: 53

## 2024-05-14 ENCOUNTER — EMERGENCY (EMERGENCY)
Facility: HOSPITAL | Age: 84
LOS: 0 days | Discharge: HOME | End: 2024-05-14
Attending: EMERGENCY MEDICINE
Payer: MEDICARE

## 2024-05-14 VITALS
DIASTOLIC BLOOD PRESSURE: 91 MMHG | OXYGEN SATURATION: 97 % | RESPIRATION RATE: 20 BRPM | SYSTOLIC BLOOD PRESSURE: 193 MMHG | HEART RATE: 68 BPM

## 2024-05-14 VITALS
DIASTOLIC BLOOD PRESSURE: 92 MMHG | OXYGEN SATURATION: 97 % | WEIGHT: 139.99 LBS | HEIGHT: 66 IN | HEART RATE: 72 BPM | SYSTOLIC BLOOD PRESSURE: 195 MMHG | TEMPERATURE: 99 F | RESPIRATION RATE: 16 BRPM

## 2024-05-14 DIAGNOSIS — Z96.652 PRESENCE OF LEFT ARTIFICIAL KNEE JOINT: Chronic | ICD-10-CM
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
## 2024-05-14 DIAGNOSIS — R11.2 NAUSEA WITH VOMITING, UNSPECIFIED: ICD-10-CM

## 2024-05-14 DIAGNOSIS — R51.9 HEADACHE, UNSPECIFIED: ICD-10-CM

## 2024-05-14 DIAGNOSIS — S09.90XA UNSPECIFIED INJURY OF HEAD, INITIAL ENCOUNTER: ICD-10-CM
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
## 2024-05-14 DIAGNOSIS — Z86.718 PERSONAL HISTORY OF OTHER VENOUS THROMBOSIS AND EMBOLISM: ICD-10-CM

## 2024-05-14 DIAGNOSIS — Z79.01 LONG TERM (CURRENT) USE OF ANTICOAGULANTS: ICD-10-CM

## 2024-05-14 DIAGNOSIS — S00.83XA CONTUSION OF OTHER PART OF HEAD, INITIAL ENCOUNTER: ICD-10-CM

## 2024-05-14 LAB
ALBUMIN SERPL ELPH-MCNC: 4.8 G/DL — SIGNIFICANT CHANGE UP (ref 3.5–5.2)
ALP SERPL-CCNC: 83 U/L — SIGNIFICANT CHANGE UP (ref 30–115)
ALT FLD-CCNC: 12 U/L — SIGNIFICANT CHANGE UP (ref 0–41)
ANION GAP SERPL CALC-SCNC: 9 MMOL/L — SIGNIFICANT CHANGE UP (ref 7–14)
APTT BLD: 31.7 SEC — SIGNIFICANT CHANGE UP (ref 27–39.2)
AST SERPL-CCNC: 22 U/L — SIGNIFICANT CHANGE UP (ref 0–41)
BASOPHILS # BLD AUTO: 0.04 K/UL — SIGNIFICANT CHANGE UP (ref 0–0.2)
BASOPHILS NFR BLD AUTO: 0.5 % — SIGNIFICANT CHANGE UP (ref 0–1)
BILIRUB SERPL-MCNC: 0.6 MG/DL — SIGNIFICANT CHANGE UP (ref 0.2–1.2)
BUN SERPL-MCNC: 15 MG/DL — SIGNIFICANT CHANGE UP (ref 10–20)
CALCIUM SERPL-MCNC: 10.8 MG/DL — HIGH (ref 8.4–10.5)
CHLORIDE SERPL-SCNC: 100 MMOL/L — SIGNIFICANT CHANGE UP (ref 98–110)
CO2 SERPL-SCNC: 33 MMOL/L — HIGH (ref 17–32)
CREAT SERPL-MCNC: 1 MG/DL — SIGNIFICANT CHANGE UP (ref 0.7–1.5)
EGFR: 74 ML/MIN/1.73M2 — SIGNIFICANT CHANGE UP
EOSINOPHIL # BLD AUTO: 0.01 K/UL — SIGNIFICANT CHANGE UP (ref 0–0.7)
EOSINOPHIL NFR BLD AUTO: 0.1 % — SIGNIFICANT CHANGE UP (ref 0–8)
GLUCOSE SERPL-MCNC: 125 MG/DL — HIGH (ref 70–99)
HCT VFR BLD CALC: 45.5 % — SIGNIFICANT CHANGE UP (ref 42–52)
HGB BLD-MCNC: 14.3 G/DL — SIGNIFICANT CHANGE UP (ref 14–18)
IMM GRANULOCYTES NFR BLD AUTO: 0.3 % — SIGNIFICANT CHANGE UP (ref 0.1–0.3)
INR BLD: 0.98 RATIO — SIGNIFICANT CHANGE UP (ref 0.65–1.3)
LACTATE SERPL-SCNC: 1.5 MMOL/L — SIGNIFICANT CHANGE UP (ref 0.7–2)
LIDOCAIN IGE QN: 28 U/L — SIGNIFICANT CHANGE UP (ref 7–60)
LYMPHOCYTES # BLD AUTO: 0.98 K/UL — LOW (ref 1.2–3.4)
LYMPHOCYTES # BLD AUTO: 12.6 % — LOW (ref 20.5–51.1)
MCHC RBC-ENTMCNC: 26.5 PG — LOW (ref 27–31)
MCHC RBC-ENTMCNC: 31.4 G/DL — LOW (ref 32–37)
MCV RBC AUTO: 84.3 FL — SIGNIFICANT CHANGE UP (ref 80–94)
MONOCYTES # BLD AUTO: 0.32 K/UL — SIGNIFICANT CHANGE UP (ref 0.1–0.6)
MONOCYTES NFR BLD AUTO: 4.1 % — SIGNIFICANT CHANGE UP (ref 1.7–9.3)
NEUTROPHILS # BLD AUTO: 6.42 K/UL — SIGNIFICANT CHANGE UP (ref 1.4–6.5)
NEUTROPHILS NFR BLD AUTO: 82.4 % — HIGH (ref 42.2–75.2)
NRBC # BLD: 0 /100 WBCS — SIGNIFICANT CHANGE UP (ref 0–0)
PLATELET # BLD AUTO: 242 K/UL — SIGNIFICANT CHANGE UP (ref 130–400)
PMV BLD: 11.3 FL — HIGH (ref 7.4–10.4)
POTASSIUM SERPL-MCNC: 4.5 MMOL/L — SIGNIFICANT CHANGE UP (ref 3.5–5)
POTASSIUM SERPL-SCNC: 4.5 MMOL/L — SIGNIFICANT CHANGE UP (ref 3.5–5)
PROT SERPL-MCNC: 7.3 G/DL — SIGNIFICANT CHANGE UP (ref 6–8)
PROTHROM AB SERPL-ACNC: 11.2 SEC — SIGNIFICANT CHANGE UP (ref 9.95–12.87)
RBC # BLD: 5.4 M/UL — SIGNIFICANT CHANGE UP (ref 4.7–6.1)
RBC # FLD: 14.1 % — SIGNIFICANT CHANGE UP (ref 11.5–14.5)
SODIUM SERPL-SCNC: 142 MMOL/L — SIGNIFICANT CHANGE UP (ref 135–146)
WBC # BLD: 7.79 K/UL — SIGNIFICANT CHANGE UP (ref 4.8–10.8)
WBC # FLD AUTO: 7.79 K/UL — SIGNIFICANT CHANGE UP (ref 4.8–10.8)

## 2024-05-14 PROCEDURE — 99284 EMERGENCY DEPT VISIT MOD MDM: CPT

## 2024-05-14 PROCEDURE — 85730 THROMBOPLASTIN TIME PARTIAL: CPT

## 2024-05-14 PROCEDURE — 83605 ASSAY OF LACTIC ACID: CPT

## 2024-05-14 PROCEDURE — 71045 X-RAY EXAM CHEST 1 VIEW: CPT

## 2024-05-14 PROCEDURE — 85610 PROTHROMBIN TIME: CPT

## 2024-05-14 PROCEDURE — 85025 COMPLETE CBC W/AUTO DIFF WBC: CPT

## 2024-05-14 PROCEDURE — 80053 COMPREHEN METABOLIC PANEL: CPT

## 2024-05-14 PROCEDURE — 72170 X-RAY EXAM OF PELVIS: CPT | Mod: 26

## 2024-05-14 PROCEDURE — 71045 X-RAY EXAM CHEST 1 VIEW: CPT | Mod: 26

## 2024-05-14 PROCEDURE — 70450 CT HEAD/BRAIN W/O DYE: CPT | Mod: MC

## 2024-05-14 PROCEDURE — 76705 ECHO EXAM OF ABDOMEN: CPT

## 2024-05-14 PROCEDURE — 83690 ASSAY OF LIPASE: CPT

## 2024-05-14 PROCEDURE — 72170 X-RAY EXAM OF PELVIS: CPT

## 2024-05-14 PROCEDURE — 76705 ECHO EXAM OF ABDOMEN: CPT | Mod: 26

## 2024-05-14 PROCEDURE — 72125 CT NECK SPINE W/O DYE: CPT | Mod: MC

## 2024-05-14 PROCEDURE — 96374 THER/PROPH/DIAG INJ IV PUSH: CPT

## 2024-05-14 PROCEDURE — 72125 CT NECK SPINE W/O DYE: CPT | Mod: 26,MC

## 2024-05-14 PROCEDURE — 36415 COLL VENOUS BLD VENIPUNCTURE: CPT

## 2024-05-14 PROCEDURE — 99291 CRITICAL CARE FIRST HOUR: CPT | Mod: 25

## 2024-05-14 PROCEDURE — 70450 CT HEAD/BRAIN W/O DYE: CPT | Mod: 26,MC

## 2024-05-14 PROCEDURE — 82962 GLUCOSE BLOOD TEST: CPT

## 2024-05-14 RX ORDER — ONDANSETRON 8 MG/1
4 TABLET, FILM COATED ORAL ONCE
Refills: 0 | Status: COMPLETED | OUTPATIENT
Start: 2024-05-14 | End: 2024-05-14

## 2024-05-14 RX ORDER — SODIUM CHLORIDE 9 MG/ML
250 INJECTION INTRAMUSCULAR; INTRAVENOUS; SUBCUTANEOUS ONCE
Refills: 0 | Status: COMPLETED | OUTPATIENT
Start: 2024-05-14 | End: 2024-05-14

## 2024-05-14 RX ADMIN — ONDANSETRON 4 MILLIGRAM(S): 8 TABLET, FILM COATED ORAL at 16:57

## 2024-05-14 NOTE — ED PROVIDER NOTE - PATIENT PORTAL LINK FT
You can access the FollowMyHealth Patient Portal offered by [facility] by registering at the following website: [email or web address] By joining [employer]’s FollowMyHealth portal, you will also be able to view your health information using other applications (apps) compatible with our system.

## 2024-05-14 NOTE — ED ADULT NURSE NOTE - IN THE PAST 12 MONTHS HAVE YOU USED DRUGS OTHER THAN THOSE REQUIRED FOR MEDICAL REASON?
You will need to return to clinic in 48-72 hours to have results of TB test read. Please return to clinic on 04/18 between 2:20pm-7:30pm  or on 04/19 between 8am - 2:20pm  to have your TB test read. No

## 2024-05-14 NOTE — CONSULT NOTE ADULT - ASSESSMENT
ASSESSMENT:  84y Male  w/ PMHx of DVT on eliquis, HTN and HLD seen as Trauma Alert s/p fall +HT, -LOC, +AC  complaint of nausea/emesis , external signs of trauma as listed below. Patient was brought in by family due to multiple episodes of emesis since fall yesterday. Trauma assessment in ED: ABCs intact , GCS 15 , AAOx3,  MÁRQUEZ.     Injuries identified:   - L frontal scalp abrasion  -   -     PLAN:   - Trauma Labs: (CBC, BMP, Coags, T&S, UA, EtOH level)  Additional studies:  EKG    Trauma Imaging to include the following:  - CXR, Pelvic Xray  - CT Head   - Extremity films: None    Additional consultations: pending   - Neurosurgery  - Orthopedics  - OMFS  - PT/Rehab/SW  - Hospitalist/Medicine       Above plan discussed with Trauma attending, Dr. Garland  , patient, patient family, and ED team  --------------------------------------------------------------------------------------  05-14-24 @ 16:50    TRAUMA SENIOR SPECTRA: [id number]  TRAUMA TEAM SPECTRA: [id number]

## 2024-05-14 NOTE — CONSULT NOTE ADULT - ATTENDING COMMENTS
Trauma Attending H&P Attestation    Patient seen and evaluated with the trauma team in the trauma bay upon arrival. All pertinent labs and radiographic imaging reviewed, pending final reports. Outpatient medications reviewed, including the presence of anticoagulants, if applicable. I agree with the resident's note above, including the physical exam findings, assessment and plan as documented with the following adjustments.     Trauma Level: [ ] Code  [ x] Alert  [ ] Consult [ ] Transfer in  Patient is seen at the bedside at 16:31  Activation by:  [ x] ED physician [x ] EMS  Intubated in Field? [ ] Yes [x ] No  Intubated in ED? [ ] Yes [x ] No  Intubated in Trauma [place]? [ ] Yes [x ] No    MIKE WATERS Patient is a 84y old  Male who presents with a chief complaint of  fall with head injury on Eliquis yesterday and since he had multiple episodes of nausea and vomiting  Patient presented with GCS [15 ]  upon arrival to the trauma bay.  Allergies  Allergy Status Unknown  No Known Allergies  Intolerances    PAST MEDICAL & SURGICAL HISTORY:  HTN (hypertension)  Dementia  S/P TKR (total knee replacement), left    On AC/Antiplatelets [ x] Yes [ ] No              [x ] NOVACs, [ ] Coumadin, [ ] ASA, [ ] Antiplatelets     Vital Signs Last 24 Hrs  T(C): 36.9 (14 May 2024 17:00), Max: 37.1 (14 May 2024 16:16)  T(F): 98.4 (14 May 2024 17:00), Max: 98.7 (14 May 2024 16:16)  HR: 68 (14 May 2024 18:20) (68 - 72)  BP: 193/91 (14 May 2024 18:20) (193/91 - 199/85)  BP(mean): --  RR: 20 (14 May 2024 18:20) (16 - 20)  SpO2: 97% (14 May 2024 18:20) (97% - 97%)    Parameters below as of 14 May 2024 18:20  Patient On (Oxygen Delivery Method): room air    PE: left forehead hematoma and abrasion  Assessment: Head injury on Eliquis                     Fall                     Brain concussion   PLAN  - supportive care  - GI/DVT prophylaxis  - pain management  - repeat studies as needed  - complete and follow up on trauma work up included but not limites to                          [x ] CXR [ x] PXR [ ] Extremities X-RAYs                          [x ] NCHCT [ ] C-Spine CT [ ] CT Chest [ ] CT Abdomen/Pelvis  [ x] FAST [ ] Other                          [x ] Trauma Labs   [ ] Toxicology                    I independently read and reviewed the above studies -> NO acute traumatic injuries identified with exception as above   - Follow up Consults  [ ] Neurosurgery [ ] Orthopaedics [ ] Plastics [ ] Fascial/OMFS [ ] Opthalmology   [ ] Urology  [ ] ENT  [ ] Pediatric ICU                                  [ ] SICU/SDU [ ] Burn/Burn ICU  [ ] Medicine [ ] Geriatrics [ ] Cardiology/EP [ ] Hospice/Palliative Care                                      - IV ABx give as indicated [ ] Yes [x ] No  - Tetanus given as indicated [ ] Yes [x ] No  - Seizures prophylaxis  [ ] Yes,  [x ] No  - No further intervention from trauma stand point  - followup with neurology for concussion enter     Negar Garland MD, FACS  Trauma/ACS/Surgical Critical Care Attending

## 2024-05-14 NOTE — ED ADULT NURSE NOTE - NS TRANSFER PATIENT BELONGINGS
89y F w/ PMH of HTN, HLD, SVT s/p ablation on 6/21/2018, endometrial CA s/p LYNETTE presents found on floor of home after fall, a/f syncopal eval,  rhabdomyolysis, SVT, in the setting of acute viral gastroenteritis:        Acute cystitis without hematuria.  Plan: -Urine cx positive for >100k E. coli pan-sensitive    -to complete Ceftriaxone 1gm daily x 3 days. last day today.         Rhabdomyolysis.  Plan: secondary to fall, with CK elevation, downtrending    -d/c IVF.          Fall, initial encounter.  Plan: suspect may have had vasovagal episode 2/2 dehydration from N/V. N/V also may have triggered SVT.     -above imaging neg for acute fx    -PT recs for inpatient restorative rehab, however pt wants to go home.         Renal insufficiency.  Plan: -Resolved    -likely prerenal 2/2 dehydration from gastroenteritis vs rhabdo    -renally dose meds.         SVT (supraventricular tachycardia).  Plan: Resolved     -in setting of missing home meds and dehydration 2/2 gastroenteritis    -Continue  metoprolol dose increased to 25m bid, asa    -TSH normal    -EP following; appreciate recs.         HLD (hyperlipidemia). Plan: - c/w Atorvastatin.        HTN (hypertension).  Plan: c/w BB.         PAF (paroxysmal atrial fibrillation).  Plan: CHADSVASC= 4    continue xarelto- renally dosed    c/w BB.         Anxiety.  Plan: c/w trazadone, and alprazolam prn    istop in chart.         Prophylactic measure. Plan; dvt ppx: xarelto    diet: DASH    dispo: pt recs restorative rehab         1/14/20- As per attending, patient stable for discharge.
Clothing

## 2024-05-14 NOTE — ED PROVIDER NOTE - CLINICAL SUMMARY MEDICAL DECISION MAKING FREE TEXT BOX
84-year-old male presents emergency department status post mechanical fall.  X-rays did not demonstrate any acute pathology CT is negative.  Patient has no concerns at this time would like to go home.  Trauma surgery was consulted and cleared patient for discharge.

## 2024-05-14 NOTE — ED PROVIDER NOTE - NSFOLLOWUPINSTRUCTIONS_ED_ALL_ED_FT
Fall Prevention in the Home, Adult  Falls can cause injuries and can happen to people of all ages. There are many things you can do to make your home safer and to help prevent falls.    What actions can I take to prevent falls?  General information    Use good lighting in all rooms. Make sure to:  Replace any light bulbs that burn out.  Turn on the lights in dark areas and use night-lights.  Keep items that you use often in easy-to-reach places. Lower the shelves around your home if needed.  Move furniture so that there are clear paths around it.  Do not use throw rugs or other things on the floor that can make you trip.  If any of your floors are uneven, fix them.  Add color or contrast paint or tape to clearly kay and help you see:  Grab bars or handrails.  First and last steps of staircases.  Where the edge of each step is.  If you use a ladder or stepladder:  Make sure that it is fully opened. Do not climb a closed ladder.  Make sure the sides of the ladder are locked in place.  Have someone hold the ladder while you use it.  Know where your pets are as you move through your home.  What can I do in the bathroom?    A grab bar next to a toilet.  Shower and bathtub, showing safety grab bars on the walls.  Keep the floor dry. Clean up any water on the floor right away.  Remove soap buildup in the bathtub or shower. Buildup makes bathtubs and showers slippery.  Use non-skid mats or decals on the floor of the bathtub or shower.  Attach bath mats securely with double-sided, non-slip rug tape.  If you need to sit down in the shower, use a non-slip stool.  Install grab bars by the toilet and in the bathtub and shower. Do not use towel bars as grab bars.  What can I do in the bedroom?    Make sure that you have a light by your bed that is easy to reach.  Do not use any sheets or blankets on your bed that hang to the floor.  Have a firm chair or bench with side arms that you can use for support when you get dressed.  What can I do in the kitchen?    Clean up any spills right away.  If you need to reach something above you, use a step stool with a grab bar.  Keep electrical cords out of the way.  Do not use floor polish or wax that makes floors slippery.  What can I do with my stairs?    Do not leave anything on the stairs.  Make sure that you have a light switch at the top and the bottom of the stairs.  Make sure that there are handrails on both sides of the stairs. Fix handrails that are broken or loose.  Install non-slip stair treads on all your stairs if they do not have carpet.  Avoid having throw rugs at the top or bottom of the stairs.  Choose a carpet that does not hide the edge of the steps on the stairs. Make sure that the carpet is firmly attached to the stairs. Fix carpet that is loose or worn.  What can I do on the outside of my home?    Use bright outdoor lighting.  Fix the edges of walkways and driveways and fix any cracks. Clear paths of anything that can make you trip, such as tools or rocks.  Add color or contrast paint or tape to clearly kay and help you see anything that might make you trip as you walk through a door, such as a raised step or threshold.  Trim any bushes or trees on paths to your home.  Check to see if handrails are loose or broken and that both sides of all steps have handrails. Install guardrails along the edges of any raised decks and porches.  Have leaves, snow, or ice cleared regularly. Use sand, salt, or ice melter on paths if you live where there is ice and snow during the winter.  Clean up any spills in your garage right away. This includes grease or oil spills.  What other actions can I take?    Review your medicines with your doctor. Some medicines can cause dizziness or changes in blood pressure, which increase your risk of falling.  Wear shoes that:  Have a low heel. Do not wear high heels.  Have rubber bottoms and are closed at the toe.  Feel good on your feet and fit well.  Use tools that help you move around if needed. These include:  Canes.  Walkers.  Scooters.  Crutches.  Ask your doctor what else you can do to help prevent falls. This may include seeing a physical therapist to learn to do exercises to move better and get stronger.  Where to find more information  Centers for Disease Control and Prevention, [employer]: cdc.gov  [employer] [place] on Aging: shelli.nih.gov  [employer] [place] on Aging: shelli.nih.gov  Contact a doctor if:  You are afraid of falling at home.  You feel weak, drowsy, or dizzy at home.  You fall at home.  Get help right away if you:  Lose consciousness or have trouble moving after a fall.  Have a fall that causes a head injury.  These symptoms may be an emergency. Get help right away. Call 911.  Do not wait to see if the symptoms will go away.  Do not drive yourself to the hospital.  This information is not intended to replace advice given to you by your health care provider. Make sure you discuss any questions you have with your health care provider.

## 2024-05-14 NOTE — CONSULT NOTE ADULT - SUBJECTIVE AND OBJECTIVE BOX
TRAUMA ACTIVATION LEVEL:   ALERT    ACTIVATED BY:  ED**  INTUBATED:  NO**      MECHANISM OF INJURY:   [] Blunt     [] MVC	  [x] Fall	  [] Pedestrian Struck	  [] Motorcycle     [] Assault     [] Bicycle collision    [] Sports injury    [] Penetrating    [] Gun Shot Wound      [] Stab Wound    GCS: 15 	E: 4	V: 5	M: 6    HPI:  84y Male  w/ PMHx of DVT on eliquis, HTN and HLD seen as Trauma Alert s/p fall +HT, -LOC, +AC  complaint of nausea/emesis , external signs of trauma as listed below. Patient was brought in by family due to multiple episodes of emesis since fall yesterday. Trauma assessment in ED: ABCs intact , GCS 15 , AAOx3,  MÁRQUEZ.     Fatigue: How much time during the previous 4 weeks did you feel tired?   All or most of the time [   ] Yes (1pt)    [x  ] No  (0pts)  Resistance: Do you have any difficulty walking up 10 steps alone without resting and without aids? [   ] Yes (1pt)    [ x ] No  (0pts)  Ambulation: Do you have any difficulty walking several hundred yards alone without aids? [   ] Yes (1pt)    [ x ] No  (0pts)  Illness: how many illnesses do you have out of list of 11 total? [   ] 5 or more (1pt) [ x ] < 5 (0pts)  Loss of weight: Have you had weight loss of 5% or more? [   ] Yes (1pt)    [x  ] No  (0pts)    Total Score: 0    Score 1-2: Consult medical comangement  Score 3-4: Consult Geriatric service   Score 5: Consult Palliative service [phone]    Nora Weiss G, Flaquito HARDY, Ava DE DIOS, Karen GILBERT. Frality: toward a clinical definition. J AM Med Dir Assoc. 2008; 9 (2): 71-72  Nina JE, Kike TK, Andujar DK. A simple frailty questionnaire (FRAIL) predicts outcomes in middle aged  Americans. J Nutr Health Aging. 2012; 16 (7): 601-608    PAST MEDICAL & SURGICAL HISTORY:  HTN (hypertension)      Dementia      S/P TKR (total knee replacement), left          Allergies    Allergy Status Unknown  No Known Allergies    Intolerances        Home Medications:  aspirin 81 mg oral delayed release tablet: 1 tab(s) orally once a day (11 Jul 2019 17:07)  atorvastatin 10 mg oral tablet: 1 tab(s) orally once a day (11 Jul 2019 17:07)  losartan 50 mg oral tablet: 1 tab(s) orally once a day (at bedtime) (11 Jul 2019 17:07)  metoprolol:  (11 Jul 2019 17:07)  omeprazole 40 mg oral delayed release capsule: 1 cap(s) orally once a day (11 Jul 2019 17:07)      ROS: 10-system review is otherwise negative except HPI above.      Primary Survey:    A - airway intact  B - bilateral breath sounds and good chest rise  C - palpable pulses in all extremities  D - GCS 15 on arrival, MÁRQUEZ  Exposure obtained    Vital Signs Last 24 Hrs  T(C): 37.1 (14 May 2024 16:16), Max: 37.1 (14 May 2024 16:16)  T(F): 98.7 (14 May 2024 16:16), Max: 98.7 (14 May 2024 16:16)  HR: 72 (14 May 2024 16:16) (72 - 72)  BP: 195/92 (14 May 2024 16:16) (195/92 - 195/92)  BP(mean): --  RR: 16 (14 May 2024 16:16) (16 - 16)  SpO2: 97% (14 May 2024 16:16) (97% - 97%)        Secondary Survey:   General: NAD  HEENT: Normocephalic, EOMI, PEERLA. left scalp abrasion  Neck: Soft, midline trachea. no c-spine tenderness  Chest: No chest wall tenderness, no subcutaneous emphysema   Cardiac: S1, S2, RRR  Respiratory: Bilateral breath sounds, clear and equal bilaterally  Abdomen: Soft, non-distended, non-tender, no rebound, no guarding.  Groin: Normal appearing, pelvis stable   Ext:  Moving b/l upper and lower extremities. Palpable Radial b/l UE, b/l DP palpable in LE.   Back: No T/L/S spine tenderness, No palpable runoff/stepoff/deformity    ACCESS / DEVICES:  [ x] Peripheral IV  [ ] Central Venous Line	[ ] R	[ ] L	[ ] IJ	[ ] Fem	[ ] SC	Placed:   [ ] Arterial Line		[ ] R	[ ] L	[ ] Fem	[ ] Rad	[ ] Ax	Placed:   [ ] PICC:					[ ] Mediport  [ ] Urinary Catheter,  Date Placed:   [ ] Chest tube: [ ] Right, [ ] Left  [ ] ALBINO/Bob Drains    Labs:  CAPILLARY BLOOD GLUCOSE      POCT Blood Glucose.: 117 mg/dL (14 May 2024 16:21)                  LFTs:         Coags:                        RADIOLOGY & ADDITIONAL STUDIES:  ---------------------------------------------------------------------------------------     TRAUMA ACTIVATION LEVEL:   ALERT    ACTIVATED BY:  ED**  INTUBATED:  NO**      MECHANISM OF INJURY:   [] Blunt     [] MVC	  [x] Fall	  [] Pedestrian Struck	  [] Motorcycle     [] Assault     [] Bicycle collision    [] Sports injury    [] Penetrating    [] Gun Shot Wound      [] Stab Wound    GCS: 15 	E: 4	V: 5	M: 6    HPI:  84y Male  w/ PMHx of DVT on eliquis, HTN and HLD seen as Trauma Alert s/p fall +HT, -LOC, +AC  complaint of nausea/emesis , external signs of trauma as listed below. Patient was brought in by family due to multiple episodes of emesis since fall yesterday. Trauma assessment in ED: ABCs intact , GCS 15 , AAOx3,  MÁRQUEZ.     Fatigue: How much time during the previous 4 weeks did you feel tired?   All or most of the time [   ] Yes (1pt)    [x  ] No  (0pts)  Resistance: Do you have any difficulty walking up 10 steps alone without resting and without aids? [   ] Yes (1pt)    [ x ] No  (0pts)  Ambulation: Do you have any difficulty walking several hundred yards alone without aids? [   ] Yes (1pt)    [ x ] No  (0pts)  Illness: how many illnesses do you have out of list of 11 total? [   ] 5 or more (1pt) [ x ] < 5 (0pts)  Loss of weight: Have you had weight loss of 5% or more? [   ] Yes (1pt)    [x  ] No  (0pts)    Total Score: 0    Score 1-2: Consult medical comangement  Score 3-4: Consult Geriatric service   Score 5: Consult Palliative service [phone]    Nora Weiss G, Flaquito HARDY, Ava DE DIOS, Karen GILBERT. Frality: toward a clinical definition. J AM Med Dir Assoc. 2008; 9 (2): 71-72  Nina JE, Kike TK, Andujar DK. A simple frailty questionnaire (FRAIL) predicts outcomes in middle aged  Americans. J Nutr Health Aging. 2012; 16 (7): 601-608    PAST MEDICAL & SURGICAL HISTORY:  HTN (hypertension)  Dementia  S/P TKR (total knee replacement), left    Allergies  Allergy Status Unknown  No Known Allergies  Intolerances    Home Medications:  aspirin 81 mg oral delayed release tablet: 1 tab(s) orally once a day (11 Jul 2019 17:07)  atorvastatin 10 mg oral tablet: 1 tab(s) orally once a day (11 Jul 2019 17:07)  losartan 50 mg oral tablet: 1 tab(s) orally once a day (at bedtime) (11 Jul 2019 17:07)  metoprolol:  (11 Jul 2019 17:07)  omeprazole 40 mg oral delayed release capsule: 1 cap(s) orally once a day (11 Jul 2019 17:07)      ROS: 10-system review is otherwise negative except HPI above.      Primary Survey:    A - airway intact  B - bilateral breath sounds and good chest rise  C - palpable pulses in all extremities  D - GCS 15 on arrival, MÁRQUEZ  Exposure obtained    Vital Signs Last 24 Hrs  T(C): 37.1 (14 May 2024 16:16), Max: 37.1 (14 May 2024 16:16)  T(F): 98.7 (14 May 2024 16:16), Max: 98.7 (14 May 2024 16:16)  HR: 72 (14 May 2024 16:16) (72 - 72)  BP: 195/92 (14 May 2024 16:16) (195/92 - 195/92)  BP(mean): --  RR: 16 (14 May 2024 16:16) (16 - 16)  SpO2: 97% (14 May 2024 16:16) (97% - 97%)    Secondary Survey:   General: NAD  HEENT: Normocephalic, EOMI, PEERLA. left scalp abrasion  Neck: Soft, midline trachea. no c-spine tenderness  Chest: No chest wall tenderness, no subcutaneous emphysema   Cardiac: S1, S2, RRR  Respiratory: Bilateral breath sounds, clear and equal bilaterally  Abdomen: Soft, non-distended, non-tender, no rebound, no guarding.  Groin: Normal appearing, pelvis stable   Ext:  Moving b/l upper and lower extremities. Palpable Radial b/l UE, b/l DP palpable in LE.   Back: No T/L/S spine tenderness, No palpable runoff/stepoff/deformity    ACCESS / DEVICES:  [ x] Peripheral IV  [ ] Central Venous Line	[ ] R	[ ] L	[ ] IJ	[ ] Fem	[ ] SC	Placed:   [ ] Arterial Line		[ ] R	[ ] L	[ ] Fem	[ ] Rad	[ ] Ax	Placed:   [ ] PICC:					[ ] Mediport  [ ] Urinary Catheter,  Date Placed:   [ ] Chest tube: [ ] Right, [ ] Left  [ ] ALBINO/Bob Drains    Labs:  CAPILLARY BLOOD GLUCOSE  POCT Blood Glucose.: 117 mg/dL (14 May 2024 16:21)    RADIOLOGY & ADDITIONAL STUDIES:  ---------------------------------------------------------------------------------------

## 2024-05-14 NOTE — ED PROVIDER NOTE - PHYSICAL EXAMINATION
Constitutional: Well developed, well nourished, no acute distress  Head: Normocephalic, Hematoma to right forehead with abrasions to the left forehead  Eyes: PERRLA, EOMI, conjunctiva and sclera WNL  ENT: Moist mucous membranes, no rhinorrhea   Neck: Supple, Nontender, no midline tenderness   Respiratory: Normal chest excursion with respiration; Breath sounds clear and equal B/L; No wheezes, rales, or rhonchi   Cardiovascular: RRR; Normal S1, S2; No murmurs, rubs or gallops   ABD/GI:  Nondistended; Nontender; No guarding, rigidity or rebound   EXT/MS: Moving all extremities; Distal pulses 2+ B/L; left le peripheral edema with mild erythema, No midline spinal tenderness Or step-offs  Skin:.  Hematoma to right forehead with abrasion to left forehead  Neurologic: AAO x 4; CN 2-12 intact; Normal motor and sensory function

## 2024-05-14 NOTE — ED PROVIDER NOTE - CONSULTANT FREE TEXT FOR MDM DISCUSSED CASE WITH QUESTION
external fit kit updated     ,DirectAddress_Unknown,[email or web address],DirectAddress_Unknown trauma surgery

## 2024-05-14 NOTE — ED PROVIDER NOTE - NSFOLLOWUPCLINICS_GEN_ALL_ED_FT
[facility] Concussion Program  Concussion Program  [address] [place] [address]   Phone: [phone]  Fax:   Follow Up Time: 1-3 Days

## 2024-05-14 NOTE — ED PROVIDER NOTE - ATTENDING CONTRIBUTION TO CARE
85 yo M with PMH of HTN, HLD, DVT on Eliquis, dementia present to the ED sp fall yesterday. Pt was sitting on the couch when he tried to get up and lost his balance and fell forward hitting his head on the ground. No LOC. Family wanted him to come to the ED yesterday but he refused. Since then pt has had nausea and vomiting. PT has been acting his normal self.     Const: No apparent distress  Eyes: PERRL, no conjunctival injection  HENT:  Neck supple without meningismus, abrasion with hematoma to forehead    CV: RRR, Warm, well-perfused extremities  RESP: CTA B/L, no tachypnea   GI: soft, non-tender, non-distended  MSK: No gross deformities appreciated, no c-spine, t-spine or l-spine tenderness or stepoffs.   Skin: Warm, dry. No rashes  Neuro: Alert, CNs II-XII grossly intact. Sensation and motor function of extremities grossly intact.  Psych: Appropriate mood and affect.    Due to AC use and head trauma, trauma alert was called

## 2024-05-14 NOTE — ED PROVIDER NOTE - OBJECTIVE STATEMENT
84-year-old male with past medical history hypertension, hyperlipidemia, dementia presenting after a fall yesterday.  Patient's daughter states he was sitting on his couch when he went to get up and fell forwards hitting his head on the ground.  Patient denies LOC.  Patient brought in today because he has been vomiting multiple times since the incident.  Patient endorsing headache at site of forehead hematoma and abrasion.  No blurry/double vision, neck pain, back pain, chest, abdominal pain, extremity pain.  Patient reports he is currently nauseous.  Patient has no other complaints at this time. Patient has been on Eliquis since February for left leg DVT

## 2024-05-21 NOTE — CHART NOTE - NSCHARTNOTEFT_GEN_A_CORE
[id number] Emailed concussion clinic 5/15 - JAKOB / Update inquired 5/16 & 5/17 - JAKOB / Concussion left message 5/20 - JAKOB    Specialty: concussion

## 2024-08-23 ENCOUNTER — TRANSCRIPTION ENCOUNTER (OUTPATIENT)
Age: 84
End: 2024-08-23

## 2024-08-24 ENCOUNTER — TRANSCRIPTION ENCOUNTER (OUTPATIENT)
Age: 84
End: 2024-08-24

## 2024-12-09 ENCOUNTER — EMERGENCY (EMERGENCY)
Facility: HOSPITAL | Age: 84
LOS: 0 days | Discharge: HOME | End: 2024-12-09
Attending: EMERGENCY MEDICINE
Payer: MEDICARE

## 2024-12-09 VITALS
OXYGEN SATURATION: 99 % | TEMPERATURE: 98 F | DIASTOLIC BLOOD PRESSURE: 83 MMHG | HEART RATE: 87 BPM | SYSTOLIC BLOOD PRESSURE: 158 MMHG | RESPIRATION RATE: 17 BRPM
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
## 2024-12-09 DIAGNOSIS — Z01.89 ENCOUNTER FOR OTHER SPECIFIED SPECIAL EXAMINATIONS: ICD-10-CM

## 2024-12-09 DIAGNOSIS — I10 ESSENTIAL (PRIMARY) HYPERTENSION: ICD-10-CM

## 2024-12-09 DIAGNOSIS — E78.5 HYPERLIPIDEMIA, UNSPECIFIED: ICD-10-CM

## 2024-12-09 DIAGNOSIS — S60.811A ABRASION OF RIGHT WRIST, INITIAL ENCOUNTER: ICD-10-CM

## 2024-12-09 DIAGNOSIS — Z79.01 LONG TERM (CURRENT) USE OF ANTICOAGULANTS: ICD-10-CM
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
## 2024-12-09 DIAGNOSIS — Z86.718 PERSONAL HISTORY OF OTHER VENOUS THROMBOSIS AND EMBOLISM: ICD-10-CM

## 2024-12-09 DIAGNOSIS — Z96.652 PRESENCE OF LEFT ARTIFICIAL KNEE JOINT: Chronic | ICD-10-CM

## 2024-12-09 DIAGNOSIS — F03.90 UNSPECIFIED DEMENTIA, UNSPECIFIED SEVERITY, WITHOUT BEHAVIORAL DISTURBANCE, PSYCHOTIC DISTURBANCE, MOOD DISTURBANCE, AND ANXIETY: ICD-10-CM

## 2024-12-09 PROCEDURE — 99285 EMERGENCY DEPT VISIT HI MDM: CPT

## 2024-12-09 PROCEDURE — 99284 EMERGENCY DEPT VISIT MOD MDM: CPT

## 2024-12-09 RX ORDER — APIXABAN 2.5 MG/1
2.5 TABLET, FILM COATED ORAL ONCE
Refills: 0 | Status: COMPLETED | OUTPATIENT
Start: 2024-12-09 | End: 2024-12-09

## 2024-12-09 RX ORDER — LOSARTAN POTASSIUM 100 MG/1
50 TABLET, FILM COATED ORAL ONCE
Refills: 0 | Status: COMPLETED | OUTPATIENT
Start: 2024-12-09 | End: 2024-12-09

## 2024-12-09 RX ORDER — PANTOPRAZOLE SODIUM 40 MG/1
40 TABLET, DELAYED RELEASE ORAL ONCE
Refills: 0 | Status: COMPLETED | OUTPATIENT
Start: 2024-12-09 | End: 2024-12-09

## 2024-12-09 RX ORDER — ESCITALOPRAM OXALATE 10 MG/1
10 TABLET, FILM COATED ORAL ONCE
Refills: 0 | Status: COMPLETED | OUTPATIENT
Start: 2024-12-09 | End: 2024-12-09

## 2024-12-09 RX ORDER — TRAZODONE HYDROCHLORIDE 150 MG/1
100 TABLET ORAL ONCE
Refills: 0 | Status: COMPLETED | OUTPATIENT
Start: 2024-12-09 | End: 2024-12-09

## 2024-12-09 RX ADMIN — PANTOPRAZOLE SODIUM 40 MILLIGRAM(S): 40 TABLET, DELAYED RELEASE ORAL at 21:11

## 2024-12-09 RX ADMIN — APIXABAN 2.5 MILLIGRAM(S): 2.5 TABLET, FILM COATED ORAL at 21:11

## 2024-12-09 RX ADMIN — LOSARTAN POTASSIUM 50 MILLIGRAM(S): 100 TABLET, FILM COATED ORAL at 21:11

## 2024-12-09 RX ADMIN — TRAZODONE HYDROCHLORIDE 100 MILLIGRAM(S): 150 TABLET ORAL at 21:11

## 2024-12-09 RX ADMIN — Medication 20 MILLIGRAM(S): at 21:11

## 2024-12-09 RX ADMIN — ESCITALOPRAM OXALATE 10 MILLIGRAM(S): 10 TABLET, FILM COATED ORAL at 21:11

## 2024-12-09 NOTE — ED PROVIDER NOTE - PHYSICAL EXAMINATION
Vital Signs: I have reviewed the initial vital signs.  Constitutional: appears stated age, no acute distress  Eyes: Sclera clear, EOMI.  Cardiovascular: S1 and S2, regular rate, regular rhythm, well-perfused extremities, radial pulses equal and 2+, pedal pulses 2+ and equal  Respiratory: unlabored respiratory effort, clear to auscultation bilaterally no wheezing, rales, or rhonchi  Gastrointestinal:  abdomen soft, non-tender  Musculoskeletal: supple neck, no lower extremity edema  Integumentary: warm, dry, superficial abrasion to right wrist  Neurologic: awake, alert, extremities’ motor and sensory functions grossly intact

## 2024-12-09 NOTE — ED PROVIDER NOTE - PATIENT PORTAL LINK FT
You can access the FollowMyHealth Patient Portal offered by [facility] by registering at the following website: [email or web address]. By joining [facility] FollowMyHealth portal, you will also be able to view your health information using other applications (apps) compatible with our system.

## 2024-12-09 NOTE — ED ADULT NURSE NOTE - CHIEF COMPLAINT QUOTE
Pt here brought in by ambulance under [facility] custody, pt brought in for medications.

## 2024-12-09 NOTE — ED PROVIDER NOTE - OBJECTIVE STATEMENT
84-year-old male with past medical history of DVT on Eliquis, HTN, HLD, dementia brought in by ambulance under [employer] arrest for home medications.  Per charge nurse Andie at [facility], patient was seen at [facility] yesterday for abrasions to right upper extremity and home medications were documented at that time, and patient got local wound care and was not discharged with any new medications.. Per officer at bedside, patient was ordered to be given his medications at hospital.  Patient denies other complaints.

## 2024-12-09 NOTE — ED PROVIDER NOTE - ATTENDING APP SHARED VISIT CONTRIBUTION OF CARE
84-year-old male PMH as noted in the chart brought by [facility] to receive his daily medication.  Patient is currently under arrest for homicide. Elderly male smiling no distress.  Patient's medications were obtained from charge nurse at [facility] where patient was seen yesterday.  Stable for discharge into [facility] custody. Vital signs were reviewed.

## 2024-12-09 NOTE — ED PROVIDER NOTE - PROGRESS NOTE DETAILS
AY: Attempted to call multiple times to patient's healthcare proxy Markos Alberto without success.  Attempted to call multiple times to Pricilla Gomes without success.  Spoke with charge nurse Andie at [facility], who states the patient was brought in yesterday for local wound care to right upper extremity abrasion and not discharged with any new medications.  States patient's home medications include Lexapro 10 mg daily, Eliquis 2.5 mg twice daily, losartan 50 mg daily, Linzess 290 mcg daily, trazodone 100 mg nightly, omeprazole 40 mg daily, atorvastatin 20 mg nightly.

## 2024-12-09 NOTE — ED PROVIDER NOTE - CLINICAL SUMMARY MEDICAL DECISION MAKING FREE TEXT BOX
84-year-old male PMH as noted in the chart brought by [facility] to receive his daily medication.  Patient is currently under arrest for homicide. Elderly male smiling no distress.  Patient's medications were obtained from charge nurse at [facility] where patient was seen yesterday.  Stable for discharge into [facility] custody. Vital signs were reviewed.